# Patient Record
Sex: FEMALE | Race: WHITE | NOT HISPANIC OR LATINO | Employment: OTHER | URBAN - METROPOLITAN AREA
[De-identification: names, ages, dates, MRNs, and addresses within clinical notes are randomized per-mention and may not be internally consistent; named-entity substitution may affect disease eponyms.]

---

## 2019-09-02 ENCOUNTER — APPOINTMENT (OUTPATIENT)
Dept: CT IMAGING | Facility: HOSPITAL | Age: 74
End: 2019-09-02

## 2019-09-02 ENCOUNTER — HOSPITAL ENCOUNTER (INPATIENT)
Facility: HOSPITAL | Age: 74
LOS: 2 days | Discharge: HOME | End: 2019-09-04
Attending: EMERGENCY MEDICINE | Admitting: INTERNAL MEDICINE

## 2019-09-02 ENCOUNTER — APPOINTMENT (OUTPATIENT)
Dept: GENERAL RADIOLOGY | Facility: HOSPITAL | Age: 74
End: 2019-09-02

## 2019-09-02 ENCOUNTER — APPOINTMENT (OUTPATIENT)
Dept: ULTRASOUND IMAGING | Facility: HOSPITAL | Age: 74
End: 2019-09-02

## 2019-09-02 ENCOUNTER — APPOINTMENT (OUTPATIENT)
Dept: MRI IMAGING | Facility: HOSPITAL | Age: 74
End: 2019-09-02

## 2019-09-02 DIAGNOSIS — Z78.9 IMPAIRED MOBILITY AND ADLS: ICD-10-CM

## 2019-09-02 DIAGNOSIS — Z74.09 IMPAIRED FUNCTIONAL MOBILITY, BALANCE, GAIT, AND ENDURANCE: ICD-10-CM

## 2019-09-02 DIAGNOSIS — I16.0 HYPERTENSIVE URGENCY: ICD-10-CM

## 2019-09-02 DIAGNOSIS — R41.0 CONFUSION: Primary | ICD-10-CM

## 2019-09-02 DIAGNOSIS — R55 NEAR SYNCOPE: ICD-10-CM

## 2019-09-02 DIAGNOSIS — R53.1 GENERALIZED WEAKNESS: ICD-10-CM

## 2019-09-02 DIAGNOSIS — Z74.09 IMPAIRED MOBILITY AND ADLS: ICD-10-CM

## 2019-09-02 DIAGNOSIS — E86.0 DEHYDRATION: ICD-10-CM

## 2019-09-02 DIAGNOSIS — E87.1 HYPONATREMIA: ICD-10-CM

## 2019-09-02 PROBLEM — R10.9 ABDOMINAL PAIN: Status: ACTIVE | Noted: 2019-09-02

## 2019-09-02 LAB
ALBUMIN SERPL-MCNC: 4.6 G/DL (ref 3.5–5.2)
ALBUMIN/GLOB SERPL: 1.4 G/DL
ALP SERPL-CCNC: 47 U/L (ref 39–117)
ALT SERPL W P-5'-P-CCNC: 17 U/L (ref 1–33)
AMPHET+METHAMPHET UR QL: NEGATIVE
AMPHETAMINES UR QL: NEGATIVE
ANION GAP SERPL CALCULATED.3IONS-SCNC: 12 MMOL/L (ref 5–15)
ANION GAP SERPL CALCULATED.3IONS-SCNC: 13 MMOL/L (ref 5–15)
APAP SERPL-MCNC: <5 MCG/ML (ref 10–30)
AST SERPL-CCNC: 24 U/L (ref 1–32)
BACTERIA UR QL AUTO: ABNORMAL /HPF
BARBITURATES UR QL SCN: NEGATIVE
BASOPHILS # BLD AUTO: 0.03 10*3/MM3 (ref 0–0.2)
BASOPHILS NFR BLD AUTO: 0.4 % (ref 0–1.5)
BENZODIAZ UR QL SCN: NEGATIVE
BILIRUB SERPL-MCNC: 0.4 MG/DL (ref 0.2–1.2)
BILIRUB UR QL STRIP: NEGATIVE
BUN BLD-MCNC: 6 MG/DL (ref 8–23)
BUN BLD-MCNC: 7 MG/DL (ref 8–23)
BUN/CREAT SERPL: 8.2 (ref 7–25)
BUN/CREAT SERPL: 8.5 (ref 7–25)
BUPRENORPHINE SERPL-MCNC: NEGATIVE NG/ML
CALCIUM SPEC-SCNC: 9.2 MG/DL (ref 8.6–10.5)
CALCIUM SPEC-SCNC: 9.6 MG/DL (ref 8.6–10.5)
CANNABINOIDS SERPL QL: NEGATIVE
CHLORIDE SERPL-SCNC: 92 MMOL/L (ref 98–107)
CHLORIDE SERPL-SCNC: 93 MMOL/L (ref 98–107)
CLARITY UR: CLEAR
CO2 SERPL-SCNC: 24 MMOL/L (ref 22–29)
CO2 SERPL-SCNC: 25 MMOL/L (ref 22–29)
COCAINE UR QL: NEGATIVE
COLOR UR: YELLOW
CREAT BLD-MCNC: 0.73 MG/DL (ref 0.57–1)
CREAT BLD-MCNC: 0.82 MG/DL (ref 0.57–1)
DEPRECATED RDW RBC AUTO: 40.4 FL (ref 37–54)
EOSINOPHIL # BLD AUTO: 0.15 10*3/MM3 (ref 0–0.4)
EOSINOPHIL NFR BLD AUTO: 2.1 % (ref 0.3–6.2)
ERYTHROCYTE [DISTWIDTH] IN BLOOD BY AUTOMATED COUNT: 12.2 % (ref 12.3–15.4)
ETHANOL BLD-MCNC: <10 MG/DL (ref 0–10)
GFR SERPL CREATININE-BSD FRML MDRD: 68 ML/MIN/1.73
GFR SERPL CREATININE-BSD FRML MDRD: 78 ML/MIN/1.73
GLOBULIN UR ELPH-MCNC: 3.2 GM/DL
GLUCOSE BLD-MCNC: 123 MG/DL (ref 65–99)
GLUCOSE BLD-MCNC: 141 MG/DL (ref 65–99)
GLUCOSE UR STRIP-MCNC: NEGATIVE MG/DL
HCT VFR BLD AUTO: 40.7 % (ref 34–46.6)
HCT VFR BLD AUTO: 42.5 % (ref 34–46.6)
HGB BLD-MCNC: 13.4 G/DL (ref 12–15.9)
HGB BLD-MCNC: 14 G/DL (ref 12–15.9)
HGB UR QL STRIP.AUTO: ABNORMAL
HOLD SPECIMEN: NORMAL
HOLD SPECIMEN: NORMAL
HYALINE CASTS UR QL AUTO: ABNORMAL /LPF
IMM GRANULOCYTES # BLD AUTO: 0.01 10*3/MM3 (ref 0–0.05)
IMM GRANULOCYTES NFR BLD AUTO: 0.1 % (ref 0–0.5)
KETONES UR QL STRIP: ABNORMAL
LEUKOCYTE ESTERASE UR QL STRIP.AUTO: NEGATIVE
LYMPHOCYTES # BLD AUTO: 1.98 10*3/MM3 (ref 0.7–3.1)
LYMPHOCYTES NFR BLD AUTO: 27.6 % (ref 19.6–45.3)
MAGNESIUM SERPL-MCNC: 2.2 MG/DL (ref 1.6–2.4)
MCH RBC QN AUTO: 29.9 PG (ref 26.6–33)
MCHC RBC AUTO-ENTMCNC: 32.9 G/DL (ref 31.5–35.7)
MCV RBC AUTO: 90.8 FL (ref 79–97)
METHADONE UR QL SCN: NEGATIVE
MONOCYTES # BLD AUTO: 0.93 10*3/MM3 (ref 0.1–0.9)
MONOCYTES NFR BLD AUTO: 13 % (ref 5–12)
NEUTROPHILS # BLD AUTO: 4.08 10*3/MM3 (ref 1.7–7)
NEUTROPHILS NFR BLD AUTO: 56.8 % (ref 42.7–76)
NITRITE UR QL STRIP: NEGATIVE
NRBC BLD AUTO-RTO: 0 /100 WBC (ref 0–0.2)
OPIATES UR QL: NEGATIVE
OSMOLALITY SERPL: 268 MOSM/KG (ref 275–295)
OSMOLALITY UR: 257 MOSM/KG (ref 300–1100)
OXYCODONE UR QL SCN: NEGATIVE
PCP UR QL SCN: NEGATIVE
PH UR STRIP.AUTO: 8 [PH] (ref 5–8)
PLATELET # BLD AUTO: 243 10*3/MM3 (ref 140–450)
PMV BLD AUTO: 10 FL (ref 6–12)
POTASSIUM BLD-SCNC: 3.9 MMOL/L (ref 3.5–5.2)
POTASSIUM BLD-SCNC: 4 MMOL/L (ref 3.5–5.2)
PROPOXYPH UR QL: NEGATIVE
PROT SERPL-MCNC: 7.8 G/DL (ref 6–8.5)
PROT UR QL STRIP: NEGATIVE
RBC # BLD AUTO: 4.68 10*6/MM3 (ref 3.77–5.28)
RBC # UR: ABNORMAL /HPF
REF LAB TEST METHOD: ABNORMAL
SALICYLATES SERPL-MCNC: <0.3 MG/DL
SODIUM BLD-SCNC: 129 MMOL/L (ref 136–145)
SODIUM BLD-SCNC: 130 MMOL/L (ref 136–145)
SODIUM UR-SCNC: 89 MMOL/L
SP GR UR STRIP: 1.02 (ref 1–1.03)
SQUAMOUS #/AREA URNS HPF: ABNORMAL /HPF
T4 FREE SERPL-MCNC: 1.34 NG/DL (ref 0.93–1.7)
TRICYCLICS UR QL SCN: NEGATIVE
TROPONIN T SERPL-MCNC: <0.01 NG/ML (ref 0–0.03)
TSH SERPL DL<=0.05 MIU/L-ACNC: 3.28 UIU/ML (ref 0.27–4.2)
UROBILINOGEN UR QL STRIP: ABNORMAL
WBC NRBC COR # BLD: 7.18 10*3/MM3 (ref 3.4–10.8)
WBC UR QL AUTO: ABNORMAL /HPF
WHOLE BLOOD HOLD SPECIMEN: NORMAL
WHOLE BLOOD HOLD SPECIMEN: NORMAL

## 2019-09-02 PROCEDURE — 80307 DRUG TEST PRSMV CHEM ANLYZR: CPT | Performed by: EMERGENCY MEDICINE

## 2019-09-02 PROCEDURE — 84443 ASSAY THYROID STIM HORMONE: CPT | Performed by: EMERGENCY MEDICINE

## 2019-09-02 PROCEDURE — 99284 EMERGENCY DEPT VISIT MOD MDM: CPT

## 2019-09-02 PROCEDURE — 25010000002 HYDRALAZINE PER 20 MG: Performed by: INTERNAL MEDICINE

## 2019-09-02 PROCEDURE — 71045 X-RAY EXAM CHEST 1 VIEW: CPT

## 2019-09-02 PROCEDURE — 25010000002 ONDANSETRON PER 1 MG: Performed by: NURSE PRACTITIONER

## 2019-09-02 PROCEDURE — 25010000002 ENOXAPARIN PER 10 MG: Performed by: NURSE PRACTITIONER

## 2019-09-02 PROCEDURE — 74177 CT ABD & PELVIS W/CONTRAST: CPT

## 2019-09-02 PROCEDURE — 84300 ASSAY OF URINE SODIUM: CPT | Performed by: NURSE PRACTITIONER

## 2019-09-02 PROCEDURE — 83735 ASSAY OF MAGNESIUM: CPT | Performed by: EMERGENCY MEDICINE

## 2019-09-02 PROCEDURE — 80053 COMPREHEN METABOLIC PANEL: CPT | Performed by: EMERGENCY MEDICINE

## 2019-09-02 PROCEDURE — 93005 ELECTROCARDIOGRAM TRACING: CPT | Performed by: INTERNAL MEDICINE

## 2019-09-02 PROCEDURE — 84439 ASSAY OF FREE THYROXINE: CPT | Performed by: EMERGENCY MEDICINE

## 2019-09-02 PROCEDURE — 76830 TRANSVAGINAL US NON-OB: CPT

## 2019-09-02 PROCEDURE — 93010 ELECTROCARDIOGRAM REPORT: CPT | Performed by: INTERNAL MEDICINE

## 2019-09-02 PROCEDURE — 93005 ELECTROCARDIOGRAM TRACING: CPT | Performed by: EMERGENCY MEDICINE

## 2019-09-02 PROCEDURE — 25010000002 IOPAMIDOL 61 % SOLUTION: Performed by: EMERGENCY MEDICINE

## 2019-09-02 PROCEDURE — 85025 COMPLETE CBC W/AUTO DIFF WBC: CPT | Performed by: EMERGENCY MEDICINE

## 2019-09-02 PROCEDURE — 83930 ASSAY OF BLOOD OSMOLALITY: CPT | Performed by: NURSE PRACTITIONER

## 2019-09-02 PROCEDURE — 80306 DRUG TEST PRSMV INSTRMNT: CPT | Performed by: EMERGENCY MEDICINE

## 2019-09-02 PROCEDURE — 85018 HEMOGLOBIN: CPT | Performed by: INTERNAL MEDICINE

## 2019-09-02 PROCEDURE — 84484 ASSAY OF TROPONIN QUANT: CPT | Performed by: EMERGENCY MEDICINE

## 2019-09-02 PROCEDURE — 97162 PT EVAL MOD COMPLEX 30 MIN: CPT

## 2019-09-02 PROCEDURE — 80048 BASIC METABOLIC PNL TOTAL CA: CPT | Performed by: NURSE PRACTITIONER

## 2019-09-02 PROCEDURE — 25010000002 LORAZEPAM PER 2 MG: Performed by: INTERNAL MEDICINE

## 2019-09-02 PROCEDURE — 81001 URINALYSIS AUTO W/SCOPE: CPT | Performed by: EMERGENCY MEDICINE

## 2019-09-02 PROCEDURE — 83935 ASSAY OF URINE OSMOLALITY: CPT | Performed by: NURSE PRACTITIONER

## 2019-09-02 PROCEDURE — 70551 MRI BRAIN STEM W/O DYE: CPT

## 2019-09-02 PROCEDURE — 85014 HEMATOCRIT: CPT | Performed by: INTERNAL MEDICINE

## 2019-09-02 PROCEDURE — 99222 1ST HOSP IP/OBS MODERATE 55: CPT | Performed by: INTERNAL MEDICINE

## 2019-09-02 PROCEDURE — 97165 OT EVAL LOW COMPLEX 30 MIN: CPT

## 2019-09-02 RX ORDER — ARIPIPRAZOLE 10 MG/1
15 TABLET ORAL DAILY
COMMUNITY
End: 2019-09-04 | Stop reason: HOSPADM

## 2019-09-02 RX ORDER — ACETAMINOPHEN 160 MG/5ML
650 SOLUTION ORAL EVERY 4 HOURS PRN
Status: DISCONTINUED | OUTPATIENT
Start: 2019-09-02 | End: 2019-09-04 | Stop reason: HOSPADM

## 2019-09-02 RX ORDER — SODIUM CHLORIDE 0.9 % (FLUSH) 0.9 %
10 SYRINGE (ML) INJECTION AS NEEDED
Status: DISCONTINUED | OUTPATIENT
Start: 2019-09-02 | End: 2019-09-04 | Stop reason: HOSPADM

## 2019-09-02 RX ORDER — ARIPIPRAZOLE 10 MG/1
10 TABLET ORAL DAILY
Status: DISCONTINUED | OUTPATIENT
Start: 2019-09-02 | End: 2019-09-02

## 2019-09-02 RX ORDER — DOCUSATE SODIUM 100 MG/1
100 CAPSULE, LIQUID FILLED ORAL 2 TIMES DAILY
Status: DISCONTINUED | OUTPATIENT
Start: 2019-09-02 | End: 2019-09-04 | Stop reason: HOSPADM

## 2019-09-02 RX ORDER — PRAVASTATIN SODIUM 20 MG
20 TABLET ORAL NIGHTLY
Status: DISCONTINUED | OUTPATIENT
Start: 2019-09-02 | End: 2019-09-04 | Stop reason: HOSPADM

## 2019-09-02 RX ORDER — HYDRALAZINE HYDROCHLORIDE 20 MG/ML
20 INJECTION INTRAMUSCULAR; INTRAVENOUS EVERY 6 HOURS PRN
Status: DISCONTINUED | OUTPATIENT
Start: 2019-09-02 | End: 2019-09-04 | Stop reason: HOSPADM

## 2019-09-02 RX ORDER — ONDANSETRON 2 MG/ML
4 INJECTION INTRAMUSCULAR; INTRAVENOUS EVERY 6 HOURS PRN
Status: DISCONTINUED | OUTPATIENT
Start: 2019-09-02 | End: 2019-09-04 | Stop reason: HOSPADM

## 2019-09-02 RX ORDER — SODIUM CHLORIDE 9 MG/ML
75 INJECTION, SOLUTION INTRAVENOUS ONCE
Status: COMPLETED | OUTPATIENT
Start: 2019-09-02 | End: 2019-09-02

## 2019-09-02 RX ORDER — ONDANSETRON 4 MG/1
4 TABLET, FILM COATED ORAL EVERY 6 HOURS PRN
Status: DISCONTINUED | OUTPATIENT
Start: 2019-09-02 | End: 2019-09-04 | Stop reason: HOSPADM

## 2019-09-02 RX ORDER — SODIUM CHLORIDE 0.9 % (FLUSH) 0.9 %
10 SYRINGE (ML) INJECTION EVERY 12 HOURS SCHEDULED
Status: DISCONTINUED | OUTPATIENT
Start: 2019-09-02 | End: 2019-09-04 | Stop reason: HOSPADM

## 2019-09-02 RX ORDER — ACETAMINOPHEN 650 MG/1
650 SUPPOSITORY RECTAL EVERY 4 HOURS PRN
Status: DISCONTINUED | OUTPATIENT
Start: 2019-09-02 | End: 2019-09-04 | Stop reason: HOSPADM

## 2019-09-02 RX ORDER — PRAVASTATIN SODIUM 20 MG
20 TABLET ORAL NIGHTLY
COMMUNITY

## 2019-09-02 RX ORDER — SODIUM CHLORIDE 9 MG/ML
100 INJECTION, SOLUTION INTRAVENOUS CONTINUOUS
Status: DISCONTINUED | OUTPATIENT
Start: 2019-09-02 | End: 2019-09-03

## 2019-09-02 RX ORDER — ARIPIPRAZOLE 10 MG/1
15 TABLET ORAL DAILY
Status: DISCONTINUED | OUTPATIENT
Start: 2019-09-02 | End: 2019-09-02 | Stop reason: ALTCHOICE

## 2019-09-02 RX ORDER — CHLORPROMAZINE HYDROCHLORIDE 50 MG/1
50 TABLET, FILM COATED ORAL DAILY
Status: DISCONTINUED | OUTPATIENT
Start: 2019-09-02 | End: 2019-09-03

## 2019-09-02 RX ORDER — ACETAMINOPHEN 325 MG/1
650 TABLET ORAL EVERY 4 HOURS PRN
Status: DISCONTINUED | OUTPATIENT
Start: 2019-09-02 | End: 2019-09-04 | Stop reason: HOSPADM

## 2019-09-02 RX ORDER — LORAZEPAM 2 MG/ML
0.5 INJECTION INTRAMUSCULAR ONCE
Status: COMPLETED | OUTPATIENT
Start: 2019-09-02 | End: 2019-09-02

## 2019-09-02 RX ADMIN — SODIUM CHLORIDE, PRESERVATIVE FREE 10 ML: 5 INJECTION INTRAVENOUS at 21:29

## 2019-09-02 RX ADMIN — DOCUSATE SODIUM 100 MG: 100 CAPSULE, LIQUID FILLED ORAL at 21:28

## 2019-09-02 RX ADMIN — ENOXAPARIN SODIUM 40 MG: 40 INJECTION SUBCUTANEOUS at 08:43

## 2019-09-02 RX ADMIN — SODIUM CHLORIDE 1000 ML: 9 INJECTION, SOLUTION INTRAVENOUS at 19:47

## 2019-09-02 RX ADMIN — CHLORPROMAZINE HYDROCHLORIDE 50 MG: 50 TABLET, SUGAR COATED ORAL at 11:43

## 2019-09-02 RX ADMIN — IOPAMIDOL 100 ML: 612 INJECTION, SOLUTION INTRAVENOUS at 02:31

## 2019-09-02 RX ADMIN — SODIUM CHLORIDE 1000 ML: 9 INJECTION, SOLUTION INTRAVENOUS at 01:00

## 2019-09-02 RX ADMIN — LORAZEPAM 0.5 MG: 2 INJECTION INTRAMUSCULAR; INTRAVENOUS at 13:11

## 2019-09-02 RX ADMIN — SODIUM CHLORIDE 75 ML/HR: 9 INJECTION, SOLUTION INTRAVENOUS at 04:52

## 2019-09-02 RX ADMIN — SODIUM CHLORIDE 50 ML/HR: 9 INJECTION, SOLUTION INTRAVENOUS at 13:11

## 2019-09-02 RX ADMIN — POLYETHYLENE GLYCOL 3350 17 G: 17 POWDER, FOR SOLUTION ORAL at 08:42

## 2019-09-02 RX ADMIN — ONDANSETRON 4 MG: 2 INJECTION INTRAMUSCULAR; INTRAVENOUS at 04:52

## 2019-09-02 RX ADMIN — HYDRALAZINE HYDROCHLORIDE 20 MG: 20 INJECTION INTRAMUSCULAR; INTRAVENOUS at 11:44

## 2019-09-02 RX ADMIN — SODIUM CHLORIDE 1000 ML: 9 INJECTION, SOLUTION INTRAVENOUS at 15:45

## 2019-09-02 RX ADMIN — DOCUSATE SODIUM 100 MG: 100 CAPSULE, LIQUID FILLED ORAL at 08:43

## 2019-09-02 RX ADMIN — PRAVASTATIN SODIUM 20 MG: 20 TABLET ORAL at 21:28

## 2019-09-02 NOTE — ED PROVIDER NOTES
"Subjective   74-year-old female presents for evaluation of generalized weakness, confusion, near syncope, and anorexia.  Of note, the patient states that she was on Thorazine since the age of 19 and took it for approximately 50 years before it was stopped 3 months ago when she was switched to Abilify.  She states that since that time she has had a gradual decline over the past few months.  Her daughter lives here in [place], and the patient lives in [facility].  Today, her daughter noted that she seems to be significantly confused when compared to baseline.  The patient feels as if she is going to pass out when she stands up.  Per her daughter, she has been \"talking out of her head.\"  She has also had a very poor appetite as of late and has had an unintentional weight loss.  Given her myriad of symptoms, she was brought here to be evaluated today.  The patient denies any headache.  No vision changes.  She is unsure as what may be triggering her symptoms.  In addition, she also states that her urine has a foul smell to it and notes a \"knot\" in her vaginal region that she is concerned about.        History provided by:  Patient and relative  Weakness - Generalized   Severity:  Moderate  Onset quality:  Gradual  Duration: 3 months.  Progression:  Worsening  Context: change in medication    Associated symptoms: abdominal pain, anorexia, foul-smelling urine and near-syncope    Associated symptoms: no chest pain, no cough, no fever and no shortness of breath        Review of Systems   Constitutional: Negative for fever.   Respiratory: Negative for cough and shortness of breath.    Cardiovascular: Positive for near-syncope. Negative for chest pain.   Gastrointestinal: Positive for abdominal pain and anorexia.   Genitourinary:        +malodorous urine   Neurological: Positive for weakness and light-headedness.   Psychiatric/Behavioral: Positive for confusion.   All other systems reviewed and are negative.      Past " Medical History:   Diagnosis Date   • Anxiety    • Depression    • HLD (hyperlipidemia)    • Hypertension    • Urinary tract infection        No Known Allergies    Past Surgical History:   Procedure Laterality Date   • HYSTERECTOMY         Family History   Problem Relation Age of Onset   • Cancer Mother    • Heart disease Father        Social History     Socioeconomic History   • Marital status:      Spouse name: Not on file   • Number of children: Not on file   • Years of education: Not on file   • Highest education level: Not on file   Tobacco Use   • Smoking status: Never Smoker   • Smokeless tobacco: Never Used   Substance and Sexual Activity   • Alcohol use: No     Frequency: Never   • Drug use: No   • Sexual activity: No         Objective   Physical Exam   Constitutional: She is oriented to person, place, and time. She appears well-developed and well-nourished. No distress.   Nontoxic-appearing elderly female in no acute distress   HENT:   Head: Normocephalic and atraumatic.   Mouth/Throat: Oropharynx is clear and moist.   Eyes: EOM are normal. Pupils are equal, round, and reactive to light.   Neck: Normal range of motion. Neck supple. No JVD present.   No meningeal signs, no nuchal rigidity   Cardiovascular: Normal rate, regular rhythm, normal heart sounds and intact distal pulses. Exam reveals no gallop and no friction rub.   No murmur heard.  Pulmonary/Chest: Effort normal and breath sounds normal. No respiratory distress. She has no wheezes. She has no rales.   Abdominal: Soft. Bowel sounds are normal. She exhibits no distension and no mass. There is no tenderness. There is no rebound and no guarding.   Genitourinary:   Genitourinary Comments: Patient deferred pelvic exam   Musculoskeletal: Normal range of motion. She exhibits no edema.   Neurological: She is alert and oriented to person, place, and time. No cranial nerve deficit or sensory deficit. Coordination normal.   Neurovascularly intact  distally in all fours no ataxia, no dysmetria, clear and fluent speech, awake, alert, and oriented x3, 5 out of 5 strength in all fours   Skin: Skin is warm and dry. No rash noted. She is not diaphoretic. No erythema.   Psychiatric: She has a normal mood and affect. Judgment and thought content normal.   Nursing note and vitals reviewed.      Procedures         ED Course  ED Course as of Sep 02 0414   Mon Sep 02, 2019   0302 Paged on-call hospitalist. -DMD  [MU]   4102 Spoke with Dr. Watters, on-call hospitalist, who will admit. -DMD  [MU]   [id number] 74-year-old female presents for evaluation of generalized weakness, poor appetite, unintentional weight loss, and confusion over the past 3 months, acutely worse over the past 24 hours.  On arrival to the ED, patient nontoxic-appearing.  No focal neurological deficits noted on exam.  Labs remarkable for mild hyponatremia as well as mild ketonuria.  MRI brain negative for acute emergent process.  Chest x-ray negative.  CT of abdomen/pelvis negative for acute emergent or surgical intra-abdominal process.  Patient declined pelvic exam.  The patient lives alone in [facility].  Given her social situation and current symptoms, I do not feel that she is safe to be discharged home at this point.  I discussed her case with Dr. Watters, and she will be admitted under her care for further evaluation and treatment.  The patient is hemodynamically stable at this time and family is aware/agreeable with the plan.  [DD]      ED Course User Index  [DD] Armando Roy MD  [MU] Yunier Rosas               Recent Results (from the past 24 hour(s))   Comprehensive Metabolic Panel    Collection Time: 09/02/19 12:57 AM   Result Value Ref Range    Glucose 141 (H) 65 - 99 mg/dL    BUN 7 (L) 8 - 23 mg/dL    Creatinine 0.82 0.57 - 1.00 mg/dL    Sodium 129 (L) 136 - 145 mmol/L    Potassium 3.9 3.5 - 5.2 mmol/L    Chloride 92 (L) 98 - 107 mmol/L    CO2 25.0 22.0 - 29.0 mmol/L    Calcium 9.6  8.6 - 10.5 mg/dL    Total Protein 7.8 6.0 - 8.5 g/dL    Albumin 4.60 3.50 - 5.20 g/dL    ALT (SGPT) 17 1 - 33 U/L    AST (SGOT) 24 1 - 32 U/L    Alkaline Phosphatase 47 39 - 117 U/L    Total Bilirubin 0.4 0.2 - 1.2 mg/dL    eGFR Non African Amer 68 >60 mL/min/1.73    Globulin 3.2 gm/dL    A/G Ratio 1.4 g/dL    BUN/Creatinine Ratio 8.5 7.0 - 25.0    Anion Gap 12.0 5.0 - 15.0 mmol/L   Troponin    Collection Time: 09/02/19 12:57 AM   Result Value Ref Range    Troponin T <0.010 0.000 - 0.030 ng/mL   Magnesium    Collection Time: 09/02/19 12:57 AM   Result Value Ref Range    Magnesium 2.2 1.6 - 2.4 mg/dL   Light Blue Top    Collection Time: 09/02/19 12:57 AM   Result Value Ref Range    Extra Tube hold for add-on    Green Top (Gel)    Collection Time: 09/02/19 12:57 AM   Result Value Ref Range    Extra Tube Hold for add-ons.    Lavender Top    Collection Time: 09/02/19 12:57 AM   Result Value Ref Range    Extra Tube hold for add-on    Gold Top - SST    Collection Time: 09/02/19 12:57 AM   Result Value Ref Range    Extra Tube Hold for add-ons.    CBC Auto Differential    Collection Time: 09/02/19 12:57 AM   Result Value Ref Range    WBC 7.18 3.40 - 10.80 10*3/mm3    RBC 4.68 3.77 - 5.28 10*6/mm3    Hemoglobin 14.0 12.0 - 15.9 g/dL    Hematocrit 42.5 34.0 - 46.6 %    MCV 90.8 79.0 - 97.0 fL    MCH 29.9 26.6 - 33.0 pg    MCHC 32.9 31.5 - 35.7 g/dL    RDW 12.2 (L) 12.3 - 15.4 %    RDW-SD 40.4 37.0 - 54.0 fl    MPV 10.0 6.0 - 12.0 fL    Platelets 243 140 - 450 10*3/mm3    Neutrophil % 56.8 42.7 - 76.0 %    Lymphocyte % 27.6 19.6 - 45.3 %    Monocyte % 13.0 (H) 5.0 - 12.0 %    Eosinophil % 2.1 0.3 - 6.2 %    Basophil % 0.4 0.0 - 1.5 %    Immature Grans % 0.1 0.0 - 0.5 %    Neutrophils, Absolute 4.08 1.70 - 7.00 10*3/mm3    Lymphocytes, Absolute 1.98 0.70 - 3.10 10*3/mm3    Monocytes, Absolute 0.93 (H) 0.10 - 0.90 10*3/mm3    Eosinophils, Absolute 0.15 0.00 - 0.40 10*3/mm3    Basophils, Absolute 0.03 0.00 - 0.20 10*3/mm3     Immature Grans, Absolute 0.01 0.00 - 0.05 10*3/mm3    nRBC 0.0 0.0 - 0.2 /100 WBC   TSH    Collection Time: 09/02/19 12:57 AM   Result Value Ref Range    TSH 3.280 0.270 - 4.200 uIU/mL   T4, Free    Collection Time: 09/02/19 12:57 AM   Result Value Ref Range    Free T4 1.34 0.93 - 1.70 ng/dL   Salicylate Level    Collection Time: 09/02/19 12:57 AM   Result Value Ref Range    Salicylate <0.3 <=30.0 mg/dL   Ethanol    Collection Time: 09/02/19 12:57 AM   Result Value Ref Range    Ethanol <10 0 - 10 mg/dL   Acetaminophen Level    Collection Time: 09/02/19 12:57 AM   Result Value Ref Range    Acetaminophen <5.0 (L) 10.0 - 30.0 mcg/mL   Urinalysis With Microscopic If Indicated (No Culture) - Urine, Clean Catch    Collection Time: 09/02/19  2:53 AM   Result Value Ref Range    Color, UA Yellow Yellow, Straw    Appearance, UA Clear Clear    pH, UA 8.0 5.0 - 8.0    Specific Gravity, UA 1.016 1.001 - 1.030    Glucose, UA Negative Negative    Ketones, UA 15 mg/dL (1+) (A) Negative    Bilirubin, UA Negative Negative    Blood, UA Moderate (2+) (A) Negative    Protein, UA Negative Negative    Leuk Esterase, UA Negative Negative    Nitrite, UA Negative Negative    Urobilinogen, UA 0.2 E.U./dL 0.2 - 1.0 E.U./dL   Urine Drug Screen - Urine, Clean Catch    Collection Time: 09/02/19  2:53 AM   Result Value Ref Range    THC, Screen, Urine Negative Negative    Phencyclidine (PCP), Urine Negative Negative    Cocaine Screen, Urine Negative Negative    Methamphetamine, Ur Negative Negative    Opiate Screen Negative Negative    Amphetamine Screen, Urine Negative Negative    Benzodiazepine Screen, Urine Negative Negative    Tricyclic Antidepressants Screen Negative Negative    Methadone Screen, Urine Negative Negative    Barbiturates Screen, Urine Negative Negative    Oxycodone Screen, Urine Negative Negative    Propoxyphene Screen Negative Negative    Buprenorphine, Screen, Urine Negative Negative   Urinalysis, Microscopic Only - Urine,  Clean Catch    Collection Time: 09/02/19  2:53 AM   Result Value Ref Range    RBC, UA 7-12 (A) None Seen, 0-2 /HPF    WBC, UA None Seen None Seen, 0-2 /HPF    Bacteria, UA None Seen None Seen, Trace /HPF    Squamous Epithelial Cells, UA None Seen None Seen, 0-2 /HPF    Hyaline Casts, UA 0-6 0 - 6 /LPF    Methodology Automated Microscopy      Note: In addition to lab results from this visit, the labs listed above may include labs taken at another facility or during a different encounter within the last 24 hours. Please correlate lab times with ED admission and discharge times for further clarification of the services performed during this visit.    CT Abdomen Pelvis With Contrast   Final Result      1. The neck of the bladder is widened and there is pelvic floor relaxation. Anterior to the neck of the bladder posterior to the symphysis pubis associated with the anterior inferior bladder wall is a soft tissue fullness which may correspond to the mass   described by the patient on physical exam. It measures up to about 3.2 cm in dimension. Please correlate with physical exam findings. This could be a mass arising from the bladder or possibly the labia and its currently nonspecific.   2. Atherosclerotic vascular calcifications.   3. Moderate-sized hilar hernia.            Signer Name: Trini Li MD    Signed: 9/2/2019 3:20 AM    Workstation Name: [facility]     Radiology [facility]      MRI Brain Without Contrast   Final Result   No acute intracranial abnormality. No recent infarct.   2. Atrophy and mild probable sequelae of small vessel disease.   3. There is what is likely a incidental arachnoid cyst overlying the medial anterior left frontal lobe.      Signer Name: Trini Li MD    Signed: 9/2/2019 2:19 AM    Workstation Name: [facility]     Radiology [facility]      XR Chest 1 View   Final Result   Generalized prominence of the interstitial markings could be acute or chronic in the  "absence of prior films.      Signer Name: Trini Li MD    Signed: 9/2/2019 1:22 AM    Workstation Name: [email or web address]     [facility]        Vitals:    09/02/19 0037 09/02/19 0245   BP: (!) 221/107 156/94   BP Location: Right arm Right arm   Patient Position: Sitting Sitting   Pulse: 94 91   Resp: 16 16   Temp: 97.8 °F (36.6 °C)    TempSrc: Oral    SpO2: 97% 98%   Weight: 73 kg (161 lb)    Height: 167.6 cm (66\")      Medications   sodium chloride 0.9 % flush 10 mL (not administered)   sodium chloride 0.9 % flush 10 mL (not administered)   sodium chloride 0.9 % bolus 1,000 mL (0 mL Intravenous Stopped 9/2/19 0130)   iopamidol (ISOVUE-300) 61 % injection 100 mL (100 mL Intravenous Given 9/2/19 0231)     ECG/EMG Results (last 24 hours)     Procedure Component Value Units Date/Time    ECG 12 Lead [[id number]] Collected:  09/02/19 0053     Updated:  09/02/19 0052        ECG 12 Lead                   MDM    Final diagnoses:   Confusion   Near syncope   Hyponatremia   Dehydration       Documentation assistance provided by hayden Rosas.  Information recorded by the scribe was done at my direction and has been verified and validated by me.     Yunier Rosas  09/02/19 0102       Alison, Yunier  09/02/19 0311       Alison, Yunier  09/02/19 0311       Alison, Yunier  09/02/19 0312       Alison, Yunier  09/02/19 0342       Armando Roy MD  09/02/19 0414    "

## 2019-09-02 NOTE — H&P
"    [facility] Medicine Services  HISTORY AND PHYSICAL    Patient Name: Betsy Youssef  : 1945  MRN: [id number]  Primary Care Physician: Duane Crow MD  Date of admission: 2019      Subjective   Subjective     Chief Complaint:  Altered mental status    HPI:  Betsy Youssef is a 74 y.o. female who presents to the ED with complaints of generalized weakness and abdominal pain.  Patient reports that for the last 3 months she has had diffuse abdominal discomfort with some constipation, fatigue, loss of appetite, dizziness, foul odor to urine, generalized weakness, and \"not feeling like doing anything\".  Three months ago she was taken off Thorazine and put on Abilify. Since the change of this medication she has not felt well.  Last Thursday she was seen in the ED at [facility] with abdominal pain and hypertension, was discharged home with follow up with PCP.  Today her symptoms returned but worse which prompted her to come to the ED.  She denies fever, shortness of air, chest pain, nausea, vomiting, diarrhea, dysuria, headaches, confusion, anxiety, depression, syncope, or any other complaints at this time.  Patient was given one liter of saline in the ED, and is being admitted to the Hospitalist for further evaluation and management.        Review of Systems   Constitutional: Positive for appetite change and fatigue. Negative for activity change, chills, diaphoresis and fever.   HENT: Negative.    Eyes: Negative.    Respiratory: Negative.    Cardiovascular: Negative.    Gastrointestinal: Positive for abdominal pain (diffuse) and constipation. Negative for abdominal distention, blood in stool, diarrhea, nausea and vomiting.   Endocrine: Negative.    Genitourinary: Negative for decreased urine volume, difficulty urinating, dysuria, flank pain, frequency, hematuria and urgency.        Foul odor to urine for 2 months   Musculoskeletal: Negative.    Skin: Negative.  "   Allergic/Immunologic: Positive for environmental allergies.   Neurological: Positive for dizziness, tremors and weakness (generalized). Negative for syncope, light-headedness, numbness and headaches.   Hematological: Negative.    Psychiatric/Behavioral: Negative for agitation, behavioral problems, confusion, decreased concentration, hallucinations, self-injury and suicidal ideas. The patient is not nervous/anxious and is not hyperactive.         All other systems reviewed and are negative.     Personal History     Past Medical History:   Diagnosis Date   • Anxiety    • Depression    • HLD (hyperlipidemia)    • Hypertension    • Urinary tract infection        Past Surgical History:   Procedure Laterality Date   • HYSTERECTOMY         Family History: family history includes Cancer in her mother; Heart disease in her father. Otherwise pertinent FHx was reviewed and unremarkable.     Social History:  reports that she has never smoked. She has never used smokeless tobacco. She reports that she does not drink alcohol or use drugs.  Social History     Social History Narrative   • Not on file       Medications:    Available home medication information reviewed.    (Not in a hospital admission)    No Known Allergies    Objective   Objective     Vital Signs:   Temp:  [97.8 °F (36.6 °C)] 97.8 °F (36.6 °C)  Heart Rate:  [91-94] 91  Resp:  [16] 16  BP: (156-221)/() 156/94        Physical Exam   Constitutional: She is oriented to person, place, and time. She appears well-developed. No distress.   HENT:   Head: Normocephalic.   Eyes: Pupils are equal, round, and reactive to light.   Neck: Normal range of motion. Neck supple.   Cardiovascular: Normal rate, regular rhythm, normal heart sounds and intact distal pulses. Exam reveals no gallop and no friction rub.   No murmur heard.  Pulmonary/Chest: Effort normal and breath sounds normal. No stridor. No respiratory distress. She has no wheezes. She has no rales.   Abdominal:  Soft. Bowel sounds are normal. She exhibits no distension and no mass. There is no tenderness. There is no rebound and no guarding.   Musculoskeletal: Normal range of motion. She exhibits no edema, tenderness or deformity.   Neurological: She is alert and oriented to person, place, and time. No cranial nerve deficit.   Skin: Skin is warm and dry. No rash noted. She is not diaphoretic. No erythema. No pallor.   Psychiatric: She has a normal mood and affect. Her behavior is normal. Thought content normal.        Results Reviewed:  I have personally reviewed current lab and radiology data.    Results from last 7 days   Lab Units 09/02/19  0057   WBC 10*3/mm3 7.18   HEMOGLOBIN g/dL 14.0   HEMATOCRIT % 42.5   PLATELETS 10*3/mm3 243     Results from last 7 days   Lab Units 09/02/19  0057   SODIUM mmol/L 129*   POTASSIUM mmol/L 3.9   CHLORIDE mmol/L 92*   CO2 mmol/L 25.0   BUN mg/dL 7*   CREATININE mg/dL 0.82   GLUCOSE mg/dL 141*   CALCIUM mg/dL 9.6   ALT (SGPT) U/L 17   AST (SGOT) U/L 24   TROPONIN T ng/mL <0.010     Estimated Creatinine Clearance: 61.6 mL/min (by C-G formula based on SCr of 0.82 mg/dL).  Brief Urine Lab Results  (Last result in the past 365 days)      Color   Clarity   Blood   Leuk Est   Nitrite   Protein   CREAT   Urine HCG        09/02/19 0253 Yellow Clear Moderate (2+) Negative Negative Negative             Imaging Results (last 24 hours)     Procedure Component Value Units Date/Time    CT Abdomen Pelvis With Contrast [[id number]] Collected:  09/02/19 0320     Updated:  09/02/19 0322    Narrative:       INDICATION:   Abdominal pain and weight loss foul-smelling urine confusion 10 pound weight loss in 3 months. Vaginal knot. Poor appetite.    TECHNIQUE:   CT of the abdomen and pelvis during the intravenous ministration of nonionic contrast media. contrast. Contrast dose recorded inpatient chart. Coronal and sagittal reconstructions were obtained.  Radiation dose reduction techniques included  automated  exposure control or exposure modulation based on body size. Radiation audit for number of CT and nuclear cardiology exams performed in the last year: 0.      COMPARISON:   None available.    FINDINGS:  Lung bases: There is some breathing motion. There is some dependent atelectasis. Calcified granuloma at the medial right lung base.    There is a moderate size hiatal hernia.    Abdomen: Liver, gallbladder, spleen, adrenal glands, pancreas are normal. There are multiple bilateral renal cysts and too small to characterize probable renal cysts. There is no hydronephrosis. There are atherosclerotic vascular calcifications involving  the abdominal aorta and vessel origins.    There is no evidence for bowel obstruction. Appendix is radiographically unremarkable.    Pelvis: There is widening of the bladder neck and likely pelvic floor relaxation. There is soft tissue fullness posterior to the symphysis pubis and at the anterior aspect of the bladder which may correspond to the mass on physical examination per  patient. It measures about 2.5 x 2.9 x 3.2 cm in dimension. Correlate with physical exam findings. Pathology arising from the anterior inferior bladder wall or possibly the labia in the differential.    No free fluid in the abdomen or pelvis. Small fat-containing periumbilical hernia.      Impression:         1. The neck of the bladder is widened and there is pelvic floor relaxation. Anterior to the neck of the bladder posterior to the symphysis pubis associated with the anterior inferior bladder wall is a soft tissue fullness which may correspond to the mass  described by the patient on physical exam. It measures up to about 3.2 cm in dimension. Please correlate with physical exam findings. This could be a mass arising from the bladder or possibly the labia and its currently nonspecific.  2. Atherosclerotic vascular calcifications.  3. Moderate-sized hilar hernia.        Signer Name: Trini Li MD    Signed: 9/2/2019 3:20 AM   Workstation Name: [facility]    Radiology [facility]    MRI Brain Without Contrast [[id number]] Collected:  09/02/19 0219     Updated:  09/02/19 0221    Narrative:       INDICATION:    Syncope confusion altered mental status increased weakness since starting Abilify.    TECHNIQUE:   MRI of the brain without contrast.    COMPARISON:    None available.    FINDINGS:  There is no abnormally restricted diffusion. There is no Chiari I malformation. Degenerative changes in the visualized upper cervical spine. There is likely an arachnoid cyst at the anteromedial aspect overlying the left frontal lobe. Its measures about  1.3 x 4.7 x 2.1 cm dimension. There is only mild associated mass effect upon the underlying frontal lobe. There is generalized atrophy otherwise. The major arterial intracranial flow voids are maintained. The mastoid air cells are clear. Only mild  mucosal thickening in the visualized paranasal sinuses. Prominent perivascular spaces and/or small chronic locations in the bilateral basal ganglia. Mild periventricular white matter signal abnormality is nonspecific but likely due to small vessel  disease.      Impression:       No acute intracranial abnormality. No recent infarct.  2. Atrophy and mild probable sequelae of small vessel disease.  3. There is what is likely a incidental arachnoid cyst overlying the medial anterior left frontal lobe.    Signer Name: Trini Li MD   Signed: 9/2/2019 2:19 AM   Workstation Name: [facility]    Radiology [facility]    XR Chest 1 View [[id number]] Collected:  09/02/19 0122     Updated:  09/02/19 0124    Narrative:       CR Chest 1 Vw    INDICATION:   Syncope and weakness altered mental status     COMPARISON:    None available.    FINDINGS:  Single portable AP view(s) of the chest.  Cardiac silhouette size is normal. There is generalized prominence of the interstitial markings. I suspect underlying chronic lung  disease. I do not have comparison films. Component of acute interstitial edema or  atypical infection not excluded. No pleural effusion or pneumothorax. No focal alveolar infiltrate.      Impression:       Generalized prominence of the interstitial markings could be acute or chronic in the absence of prior films.    Signer Name: Trini Li MD   Signed: 9/2/2019 1:22 AM   Workstation Name: [email or web address]    [facility]             Assessment/Plan   Assessment / Plan     Active Hospital Problems    Diagnosis POA   • **Hypertensive urgency [I16.0] Yes   • Hyponatremia [E87.1] Yes   • Generalized weakness [R53.1] Yes   • Abdominal pain [R10.9] Yes       ASSESSMENT and PLAN:    1.  Hypertensive urgency   -hydralazine prn    2.  Generalized weakness   -fall precautions   -pt/ot consult in the am   -consult case management in the am    3.  Possible pelvic mass   -transvaginal ultrasound     4.  Hyponatremia   -urine sodium and urine osmolality   -serum sodium   -NS @ 75 ml/hr x 1 liter   -bmp in the am    5.  Anxiety and depression   -continue abilify    -consider switching to a different antipsychotic medication    -need referral to psych      DVT prophylaxis:  lovenox    CODE STATUS:    Code Status and Medical Interventions:   Ordered at: 09/02/19 0422     Level Of Support Discussed With:    Patient     Code Status:    CPR     Medical Interventions (Level of Support Prior to Arrest):    Full       Admission Status:  I believe this patient meets OBSERVATION status, however if further evaluation or treatment plans warrant, status may change.  Based upon current information, I predict patient's care encounter to be less than or equal to 2 midnights.      Electronically signed by SPEEDY Palomino, 09/02/19, 3:11 AM.      Brief Attending Admission Attestation     I have seen and examined the patient, performing an independent face-to-face diagnostic evaluation with plan of care reviewed and  developed with the advanced practice clinician (APC).      Brief Summary Statement:   Betsy Youssef is a 74 y.o. female with history of possible psychiatric condition presents with constant mild nausea and abdominal discomfort for the past three months. She denies specific areas of abdominal pain, but does describe poor appetite and constipation. Weight loss of 10 lbs over the past three months. Denies dysuria, but foul smelling urine reported.     Symptoms began after patient's long term prescription for thorazine discontinued and abilify substituted instead. The patient was placed on thorazine approximately [facility] years ago [facility]. Ms Youssef denies a diagnosis of schizophrenia, never suffered from hallucinations or heard voices, but instead says the thorazine helped with her upset stomach and anxiety. The patient now reports that thorazine is no longer manufactured, and her PCP switched her abilify when thorazine could not be obtained.        Remainder of detailed HPI is as noted above and has been reviewed and/or edited by me for completeness.      Attending Physical Exam:  Constitutional - non toxic, in bed  HEENT-NCAT, mucous membranes moist  CV-RRR, S1 S2 normal, no m/r/g  Resp-CTAB, no wheezes, rhonchi or rales  Abd-soft, non-tender, non-distended, normo active bowel sounds  Ext-No lower extremity cyanosis, clubbing or edema bilaterally  Neuro-alert and oriented, speech clear, moves all extremities   Psych-normal affect   Skin- No rash on exposed UE or LE bilaterally      Brief Assessment/Plan :    Nausea and abdominal discomfort  - appears related to switch from Thorazine to Abilify three months ago  - suggest discussing alternatives with Pharmacy and/or Neurology in am.  Compazine is in the same drug class (Phenothiazine; first generation antipsychotic), however I am not familiar with long term dosing of this medication. Alternatively,  could consider weaning off of abilify altogether.  - Patient's PCP suggested follow up with Psychiatry to find and alternative to abilify, and I recommended the same. [facility] may be a possible resource.    Pelvic mass?  - noted on CT imaging this evening. Patient currently denies abdominal pain. Will check TV ultrasound. Also consider image review with our radiologists in the morning.     Constipation  - suspect secondary to abilify  - will start stool softeners    Hematuria  - moderate blood on UA; will need repeat UA (likely as outpatient) and if remains positive, Urology referral    Hyponatremia  - mild, asymptomatic, may be related to poor po intake  - gentle IV fluids tonight, repeat bmp in am, serum and urine osmoles pending      See above for further detailed assessment and plan developed with APC which I have reviewed and/or edited for completeness.      Electronically signed by Crow Quesada MD, 09/02/19, 4:29 AM.

## 2019-09-02 NOTE — PROGRESS NOTES
Discharge Planning Assessment  [facility]     Patient Name: Betsy Youssef  MRN: [id number]  Today's Date: 9/2/2019    Admit Date: 9/2/2019    Discharge Needs Assessment     Row Name 09/02/19 1122       Living Environment    Lives With  alone    Current Living Arrangements  home/apartment/condo Lives in [facility]    Primary Care Provided by  self    Provides Primary Care For  no one    Family Caregiver if Needed  none    Quality of Family Relationships  unable to assess    Able to Return to Prior Arrangements  other (see comments)    Living Arrangement Comments  Pt states she can't stay alone at d/c. She states she may stay with her daughter or her daughter and son will need to hire caregivers for her at home for a while.        Resource/Environmental Concerns    Resource/Environmental Concerns  none    Transportation Concerns  car, none       Transition Planning    Patient/Family Anticipates Transition to  home with family;home with help/services    Patient/Family Anticipated Services at Transition  ;home health care    Transportation Anticipated  family or friend will provide       Discharge Needs Assessment    Concerns to be Addressed  discharge planning    Equipment Currently Used at Home  none    Anticipated Changes Related to Illness  inability to care for self    Equipment Needed After Discharge  none    Outpatient/Agency/Support Group Needs  homecare agency    Current Discharge Risk  lives alone;lack of support system/caregiver        Discharge Plan     Row Name 09/02/19 1126       Plan    Plan  Home with daughter or home with HH and hired caregivers.     Patient/Family in Agreement with Plan  yes    Plan Comments  Consult for d/c planning received. Met with pt at . She lives alone in [facility]. Her daughter Shireen lives in [place] and her son is in the  in [place]. She was independent at home and denies DME or HH. She states she can't go home by herself and may stay with  her daughter in [place] or have her son and daughter hire private caregivers to stay with her for a short while. PT recommended HHPT. CM will discuss with pt once she speaks to her daughter about d/c plans. CM will follow.     Final Discharge Disposition Code  30 - still a patient        Destination      No service coordination in this encounter.      Durable Medical Equipment      No service coordination in this encounter.      Dialysis/Infusion      No service coordination in this encounter.      Home Medical Care      No service coordination in this encounter.      Therapy      No service coordination in this encounter.      Community Resources      No service coordination in this encounter.          Demographic Summary     Row Name 09/02/19 1120       General Information    Referral Source  admission list    Reason for Consult  discharge planning    Preferred Language  English     Used During This Interaction  no    General Information Comments  PCP is Duane Crow       Contact Information    Permission Granted to Share Info With  ;family/designee May speak to pt's daughter Shireen Youssef ([place]) or son who lives in [place]        Functional Status     Row Name 09/02/19 1122       Functional Status    Usual Activity Tolerance  moderate    Current Activity Tolerance  fair       Functional Status, IADL    Medications  independent    Meal Preparation  independent    Housekeeping  independent    Laundry  independent    Shopping  independent       Employment/    Employment/ Comments  Has Medicare [employer] and [place] Medicaid. Has [employer] with no concerns affording medications. Uses [facility].        Psychosocial    No documentation.       Abuse/Neglect    No documentation.       Legal    No documentation.       Substance Abuse    No documentation.       Patient Forms    No documentation.           Mila Back RN

## 2019-09-02 NOTE — THERAPY EVALUATION
Acute Care - Occupational Therapy Initial Evaluation  [facility]     Patient Name: Betsy Youssef  : 1945  MRN: [id number]  Today's Date: 2019  Onset of Illness/Injury or Date of Surgery: 19  Date of Referral to OT: 19  Referring Physician: SPEEDY Dean    Admit Date: 2019       ICD-10-CM ICD-9-CM   1. Confusion R41.0 298.9   2. Near syncope R55 780.2   3. Hyponatremia E87.1 276.1   4. Dehydration E86.0 276.51   5. Impaired mobility and ADLs Z74.09 799.89   6. Impaired functional mobility, balance, gait, and endurance Z74.09 V49.89     Patient Active Problem List   Diagnosis   • Hypertensive urgency   • Hyponatremia   • Generalized weakness   • Abdominal pain     Past Medical History:   Diagnosis Date   • Anxiety    • Depression    • HLD (hyperlipidemia)    • Hypertension    • Urinary tract infection      Past Surgical History:   Procedure Laterality Date   • HYSTERECTOMY            OT ASSESSMENT FLOWSHEET (last 12 hours)      Occupational Therapy Evaluation     Row Name 19 0830                   OT Evaluation Time/Intention    Subjective Information  no complaints  -JR        Document Type  evaluation  -JR        Mode of Treatment  occupational therapy  -JR        Patient Effort  good  -JR        Symptoms Noted During/After Treatment  none  -JR           General Information    Patient Profile Reviewed?  yes  -JR        Onset of Illness/Injury or Date of Surgery  19  -JR        Referring Physician  SPEEDY Dean  -JR        Prior Level of Function  independent:;gait;transfer;bed mobility;ADL's;home management;shopping;driving;yard work  -JR        Equipment Currently Used at Home  none  -JR        Pertinent History of Current Functional Problem  Pt admitted with diffuse abdominal discomfort with constipation, fatigue, loss of appetite, dizziness, foul odor to urine and generalized weakness x 3 months following switching medicine from thorazine to abilify. CT scan also  showed possible pelvic mass. MRI Brain showed no acute intracranial abnormality, no recent infarct  -JR        Existing Precautions/Restrictions  fall  -JR        Risks Reviewed  patient and family:;increased discomfort  -JR        Benefits Reviewed  patient and family:;improve function;increase independence  -JR        Barriers to Rehab  medically complex  -JR           Relationship/Environment    Primary Source of Support/Comfort  no one  -JR        Lives With  alone  -JR           Resource/Environmental Concerns    Current Living Arrangements  home/apartment/condo  -JR           Home Main Entrance    Number of Stairs, Main Entrance  one  -JR           Cognitive Assessment/Interventions    Additional Documentation  Cognitive Assessment/Intervention (Group)  -JR           Cognitive Assessment/Intervention- PT/OT    Affect/Mental Status (Cognitive)  anxious  -JR        Orientation Status (Cognition)  oriented x 4  -JR        Follows Commands (Cognition)  follows one step commands;over 90% accuracy  -JR        Safety Deficit (Cognitive)  mild deficit;awareness of need for assistance;insight into deficits/self awareness  -           Bed Mobility Assessment/Treatment    Bed Mobility Assessment/Treatment  supine-sit  -        Supine-Sit [place] (Bed Mobility)  supervision  -        Assistive Device (Bed Mobility)  head of bed elevated  -           Functional Mobility    Functional Mobility- Ind. Level  contact guard assist  -        Functional Mobility- Device  -- L UE Support  -        Functional Mobility-Distance (Feet)  -- in hallway  -           Transfer Assessment/Treatment    Transfer Assessment/Treatment  bed-chair transfer;sit-stand transfer  -JR           Bed-Chair Transfer    Bed-Chair [place] (Transfers)  supervision  -JR           Sit-Stand Transfer    Sit-Stand [place] (Transfers)  supervision  -JR           ADL Assessment/Intervention    BADL Assessment/Intervention  upper body  dressing  -JR           Upper Body Dressing Assessment/Training    Upper Body Dressing [place] Level  don;maximum assist (25% patient effort);verbal cues robe  -JR        Upper Body Dressing Position  unsupported sitting  -JR           General ROM    GENERAL ROM COMMENTS  B UE ROM WFL  -JR           MMT (Manual Muscle Testing)    General MMT Comments  B UE functionally 4/5  -JR           Motor Assessment/Interventions    Additional Documentation  Balance (Group)  -JR           Balance    Balance  static standing balance;dynamic standing balance  -JR           Static Standing Balance    Level of [place] (Supported Standing, Static Balance)  supervision  -JR           Dynamic Standing Balance    Level of [place], Reaches Outside Midline (Standing, Dynamic Balance)  contact guard assist  -JR           Sensory Assessment/Intervention    Sensory General Assessment  no sensation deficits identified  -JR           Positioning and Restraints    Pre-Treatment Position  in bed  -JR        Post Treatment Position  other  -JR        Other Position  with other staff Pt with transport to go to test  -JR           Pain Assessment    Additional Documentation  Pain Scale: Numbers Pre/Post-Treatment (Group)  -           Pain Scale: Numbers Pre/Post-Treatment    Pain Scale: Numbers, Pretreatment  0/10 - no pain  -        Pain Scale: Numbers, Post-Treatment  0/10 - no pain  -           Plan of Care Review    Plan of Care Reviewed With  patient;family  -JR           Clinical Impression (OT)    Date of Referral to OT  09/02/19  -JR        OT Diagnosis  Decreased independence with ADL's and mobilty  -        Patient/Family Goals Statement (OT Eval)  Pt would like to return home  -JR        Criteria for Skilled Therapeutic Interventions Met (OT Eval)  yes;treatment indicated  -JR        Rehab Potential (OT Eval)  good, to achieve stated therapy goals  -JR        Therapy Frequency (OT Eval)  daily  -JR        Care  Plan Review (OT)  risks/benefits reviewed;patient/other agree to care plan  -JR        Anticipated Discharge Disposition (OT)  home with assist;home with home health  -JR           Vital Signs    Pre Systolic BP Rehab  173  -JR        Pre Treatment Diastolic BP  93  -JR        Post Systolic BP Rehab  -- post vitals not taken as pt left for test  -JR        Pretreatment Heart Rate (beats/min)  86  -JR        Pre SpO2 (%)  97  -JR        O2 Delivery Pre Treatment  room air  -JR           Planned OT Interventions    Planned Therapy Interventions (OT Eval)  activity tolerance training;adaptive equipment training;BADL retraining;occupation/activity based interventions;patient/caregiver education/training;ROM/therapeutic exercise;strengthening exercise;transfer/mobility retraining  -JR           OT Goals    Dressing Goal Selection (OT)  dressing, OT goal 1  -JR        Strength Goal Selection (OT)  strength, OT goal 1  -JR        Activity Tolerance Goal Selection (OT)  activity tolerance, OT goal 1  -JR        Safety Awareness Goal Selection (OT)  safety awareness, OT goal 1  -JR        Additional Documentation  Activity Tolerance Goal Selection (OT) (Row);Strength Goal Selection (OT) (Row);Safety Awareness Goal Selection (OT) (Row)  -JR           Dressing Goal 1 (OT)    Activity/Assistive Device (Dressing Goal 1, OT)  lower body dressing  -JR        [place]/Cues Needed (Dressing Goal 1, OT)  set-up required  -JR        Time Frame (Dressing Goal 1, OT)  long term goal (LTG);1 week  -JR        Progress/Outcome (Dressing Goal 1, OT)  goal ongoing  -JR           Strength Goal 1 (OT)    Strength Goal 1 (OT)  Pt to demo B UE AROM 15 reps to support ADL iindependence.  -JR        Time Frame (Strength Goal 1, OT)  long term goal (LTG);1 week  -JR        Progress/Outcome (Strength Goal 1, OT)  goal ongoing  -JR            Activity Tolerance Goal 1 (OT)    Activity Tolerance Goal 1 (OT)  Pt to participate with 15 minute  activity with 1 sitting rest period to support ADL independence.  -JR        Activity Level (Endurance Goal 1, OT)  15 min activity  -JR        Time Frame (Activity Tolerance Goal 1, OT)  long term goal (LTG);1 week  -JR        Progress/Outcome (Activity Tolerance Goal 1, OT)  goal ongoing  -JR           Safety Awareness Goal 1 (OT)    Activity (Safety Awareness Goal 1, OT)  demonstration of safe behaviors  -JR        [place]/Cues/Accuracy (Safety Awareness Goal 1, OT)  with minimum;verbal cues/redirection  -JR        Time Frame (Safety Awareness Goal 1, OT)  long term goal (LTG);1 week  -JR        Progress/Outcome (Safety Awareness Goal 1, OT)  goal ongoing  -JR           Living Environment    Home Accessibility  stairs to enter home  -          User Key  (r) = Recorded By, (t) = Taken By, (c) = Cosigned By    Initials Name Effective Dates    Ingris Palmer, OT 06/22/15 -          Occupational Therapy Education     Title: PT OT SLP Therapies (In Progress)     Topic: Occupational Therapy (In Progress)     Point: ADL training (In Progress)     Description: Instruct learner(s) on proper safety adaptation and remediation techniques during self care or transfers.   Instruct in proper use of assistive devices.    Learning Progress Summary           Patient Acceptance, E, NR by  at 9/2/2019  8:30 AM    Comment:  Educated pt and family regarding role of therapy and ongoing treatment plan                               User Key     Initials Effective Dates Name Provider Type Discipline     06/22/15 -  Ingris Pulido, OT Occupational Therapist OT                  OT Recommendation and Plan  Outcome Summary/Treatment Plan (OT)  Anticipated Discharge Disposition (OT): home with assist, home with home health  Planned Therapy Interventions (OT Eval): activity tolerance training, adaptive equipment training, BADL retraining, occupation/activity based interventions, patient/caregiver education/training,  ROM/therapeutic exercise, strengthening exercise, transfer/mobility retraining  Therapy Frequency (OT Eval): daily  Plan of Care Review  Plan of Care Reviewed With: patient, family  Plan of Care Reviewed With: patient, family  Outcome Summary: OT intial eval and brief chart review completed. Pt presents with multiple comorbidities and decreased independence with ADL's and mobility. Recommend continued skilled OT services and home with assist at d/c.    Outcome Measures     Row Name 09/02/19 0830 09/02/19 0815          How much help from another person do you currently need...    Turning from your back to your side while in flat bed without using bedrails?  --  4  -SJ     Moving from lying on back to sitting on the side of a flat bed without bedrails?  --  4  -SJ     Moving to and from a bed to a chair (including a wheelchair)?  --  3  -SJ     Standing up from a chair using your arms (e.g., wheelchair, bedside chair)?  --  3  -SJ     Climbing 3-5 steps with a railing?  --  3  -SJ     To walk in hospital room?  --  3  -SJ     AM-PAC 6 Clicks Score (PT)  --  20  -SJ        How much help from another is currently needed...    Putting on and taking off regular lower body clothing?  3  -JR  --     Bathing (including washing, rinsing, and drying)  3  -JR  --     Toileting (which includes using toilet bed pan or urinal)  3  -JR  --     Putting on and taking off regular upper body clothing  2  -JR  --     Taking care of personal grooming (such as brushing teeth)  3  -JR  --     Eating meals  3  -JR  --     AM-PAC 6 Clicks Score (OT)  17  -JR  --        Functional Assessment    Outcome Measure Options  AM-PAC 6 Clicks Daily Activity (OT)  -JR  AM-PAC 6 Clicks Basic Mobility (PT)  -SJ       User Key  (r) = Recorded By, (t) = Taken By, (c) = Cosigned By    Initials Name Provider Type    Linda Cohn, PT Physical Therapist    Ingris Palmer, OT Occupational Therapist          Time Calculation:   Time Calculation- OT      Row Name 09/02/19 0830             Time Calculation- OT    OT Start Time  0830  -      OT Received On  09/02/19  -      OT Goal Re-Cert Due Date  09/12/19  -        User Key  (r) = Recorded By, (t) = Taken By, (c) = Cosigned By    Initials Name Provider Type     Ingris Pulido OT Occupational Therapist        Therapy Charges for Today     Code Description Service Date Service Provider Modifiers Qty    75790044651  OT EVAL LOW COMPLEXITY 4 9/2/2019 Ingris Pulido OT GO 1               Ingris Pulido, OT  9/2/2019

## 2019-09-02 NOTE — THERAPY EVALUATION
Patient Name: Betsy Yosusef  : 1945    MRN: [id number]                              Today's Date: 2019       Admit Date: 2019    Visit Dx:     ICD-10-CM ICD-9-CM   1. Confusion R41.0 298.9   2. Near syncope R55 780.2   3. Hyponatremia E87.1 276.1   4. Dehydration E86.0 276.51   5. Impaired mobility and ADLs Z74.09 799.89   6. Impaired functional mobility, balance, gait, and endurance Z74.09 V49.89     Patient Active Problem List   Diagnosis   • Hypertensive urgency   • Hyponatremia   • Generalized weakness   • Abdominal pain     Past Medical History:   Diagnosis Date   • Anxiety    • Depression    • HLD (hyperlipidemia)    • Hypertension    • Urinary tract infection      Past Surgical History:   Procedure Laterality Date   • HYSTERECTOMY       General Information     Row Name 19 0815          PT Evaluation Time/Intention    Document Type  evaluation  -     Mode of Treatment  physical therapy  -     Row Name 19 0815          General Information    Patient Profile Reviewed?  yes  -     Prior Level of Function  independent:;all household mobility;community mobility;gait;transfer;bed mobility;ADL's;home management;driving;shopping  -     Existing Precautions/Restrictions  no known precautions/restrictions  -     Barriers to Rehab  none identified  -     Row Name 19 0815          Relationship/Environment    Lives With  alone  -     Row Name 19 0815          Resource/Environmental Concerns    Current Living Arrangements  home/apartment/condo  -     Row Name 19 0815          Home Main Entrance    Number of Stairs, Main Entrance  one  -     Row Name 19 0815          Stairs Within Home, Primary    Number of Stairs, Within Home, Primary  none  -     Row Name 19 0815          Cognitive Assessment/Intervention- PT/OT    Orientation Status (Cognition)  oriented x 4  -     Row Name 19 0815          Safety Issues, Functional Mobility    Safety  Issues Affecting Function (Mobility)  insight into deficits/self awareness  -SJ     Impairments Affecting Function (Mobility)  balance;strength  -SJ       User Key  (r) = Recorded By, (t) = Taken By, (c) = Cosigned By    Initials Name Provider Type    Linda Cohn PT Physical Therapist        Mobility     Row Name 09/02/19 0900          Bed Mobility Assessment/Treatment    Bed Mobility Assessment/Treatment  supine-sit  -SJ     Supine-Sit [place] (Bed Mobility)  conditional independence  -SJ     Assistive Device (Bed Mobility)  head of bed elevated;bed rails  -SJ     Comment (Bed Mobility)  good safety  -SJ     Row Name 09/02/19 0900          Bed-Chair Transfer    Bed-Chair [place] (Transfers)  contact guard;1 person assist  -SJ     Row Name 09/02/19 0900          Sit-Stand Transfer    Sit-Stand [place] (Transfers)  contact guard;1 person assist  -SJ     Row Name 09/02/19 0900          Gait/Stairs Assessment/Training    [place] Level (Gait)  contact guard;1 person assist  -SJ     Assistive Device (Gait)  other (see comments) HHA  -SJ     Distance in Feet (Gait)  150  -SJ     Deviations/Abnormal Patterns (Gait)  chastity decreased;gait speed decreased  -SJ     Bilateral Gait Deviations  heel strike decreased;forward flexed posture  -SJ     Comment (Gait/Stairs)  slow speed, mildly unsteady  -SJ       User Key  (r) = Recorded By, (t) = Taken By, (c) = Cosigned By    Initials Name Provider Type    Linda Cohn PT Physical Therapist        Obj/Interventions     Row Name 09/02/19 0902          General ROM    GENERAL ROM COMMENTS  BLE's WFL  -SJ     Row Name 09/02/19 0902          MMT (Manual Muscle Testing)    General MMT Comments  BLE's grossly 4+/5  -SJ     Row Name 09/02/19 0902          Static Sitting Balance    Level of [place] (Unsupported Sitting, Static Balance)  independent  -SJ     Sitting Position (Unsupported Sitting, Static Balance)  sitting on edge of bed;sitting in  chair  -SJ     [facility] 09/02/19 0902          Static Standing Balance    Level of [place] (Supported Standing, Static Balance)  standby assist;1 person assist  -[facility] 09/02/19 0902          Dynamic Standing Balance    Level of [place], Reaches Outside Midline (Standing, Dynamic Balance)  contact guard assist;1 person assist  -SJ       User Key  (r) = Recorded By, (t) = Taken By, (c) = Cosigned By    Initials Name Provider Type    Linda Cohn, PT Physical Therapist        Goals/Plan     Row Name 09/02/19 0905          Transfer Goal 1 (PT)    Activity/Assistive Device (Transfer Goal 1, PT)  sit-to-stand/stand-to-sit;bed-to-chair/chair-to-bed  -SJ     [place] Level/Cues Needed (Transfer Goal 1, PT)  independent  -SJ     Time Frame (Transfer Goal 1, PT)  long term goal (LTG);2 weeks  -SJ     Row Name 09/02/19 0905          Gait Training Goal 1 (PT)    Activity/Assistive Device (Gait Training Goal 1, PT)  gait (walking locomotion);improve balance and speed  -SJ     [place] Level (Gait Training Goal 1, PT)  independent  -SJ     Distance (Gait Goal 1, PT)  400  -SJ     Time Frame (Gait Training Goal 1, PT)  long term goal (LTG);2 weeks  -       User Key  (r) = Recorded By, (t) = Taken By, (c) = Cosigned By    Initials Name Provider Type    Linda Cohn, PT Physical Therapist        Clinical Impression     Row Name 09/02/19 0903          Pain Assessment    Additional Documentation  Pain Scale: Numbers Pre/Post-Treatment (Group)  -[facility] 09/02/19 0903          Pain Scale: Numbers Pre/Post-Treatment    Pain Scale: Numbers, Pretreatment  0/10 - no pain  -     Pain Scale: Numbers, Post-Treatment  0/10 - no pain  -SJ     Row Name 09/02/19 0903          Plan of Care Review    Plan of Care Reviewed With  patient;family  -SJ     Row Name 09/02/19 0903          Physical Therapy Clinical Impression    Patient/Family Goals Statement (PT Clinical Impression)  return to PLOF   -     Criteria for Skilled Interventions Met (PT Clinical Impression)  yes;treatment indicated  -     Rehab Potential (PT Clinical Summary)  good, to achieve stated therapy goals  -     Predicted Duration of Therapy (PT)  2wks  -     Row Name 09/02/19 0903          Vital Signs    Pre Systolic BP Rehab  173  -     Pre Treatment Diastolic BP  93  -     Pretreatment Heart Rate (beats/min)  88  -     Pre SpO2 (%)  96  -     O2 Delivery Pre Treatment  room air  -     Row Name 09/02/19 0903          Positioning and Restraints    Pre-Treatment Position  in bed  -     Post Treatment Position  other  -     Other Position  with other staff with transport  -       User Key  (r) = Recorded By, (t) = Taken By, (c) = Cosigned By    Initials Name Provider Type    Linda Cohn PT Physical Therapist        Outcome Measures    No documentation.       Physical Therapy Education     Title: PT OT SLP Therapies (In Progress)     Topic: Physical Therapy (In Progress)     Point: Mobility training (In Progress)     Learning Progress Summary           Patient Acceptance, E,D, NR by  at 9/2/2019  9:08 AM                   Point: Home exercise program (In Progress)     Learning Progress Summary           Patient Acceptance, E,D, NR by  at 9/2/2019  9:08 AM                   Point: Body mechanics (In Progress)     Learning Progress Summary           Patient Acceptance, E,D, NR by  at 9/2/2019  9:08 AM                               User Key     Initials Effective Dates Name Provider Type [facility] 06/19/15 -  Linda Mansfield PT Physical Therapist PT              PT Recommendation and Plan  Planned Therapy Interventions (PT Eval): balance training, bed mobility training, gait training, home exercise program, patient/family education, strengthening, transfer training  Plan of Care Reviewed With: patient, family  Outcome Summary: PT eval completed. Pt presents with weakness and difficulty walking per  PLOF. Pt ambulated 150ft with CGA with HHA, with slow speed, mildly unsteady. PT recommends d/c home with HHPT.     Time Calculation:   PT Charges     Row Name 09/02/19 0815             Time Calculation    Start Time  0815  -      PT Received On  09/02/19  -      PT Goal Re-Cert Due Date  09/12/19  -        User Key  (r) = Recorded By, (t) = Taken By, (c) = Cosigned By    Initials Name Provider Type     Linda Mansfield PT Physical Therapist        Therapy Charges for Today     Code Description Service Date Service Provider Modifiers Qty    49056555629 HC PT EVAL MOD COMPLEXITY 4 9/2/2019 Linda Mansfield, PT GP 1          PT G-Codes  Outcome Measure Options: AM-PAC 6 Clicks Basic Mobility (PT)  AM-PAC 6 Clicks Score (PT): 20    Linda Mansfield PT  9/2/2019

## 2019-09-02 NOTE — PROGRESS NOTES
"                  Clinical Nutrition     Reason for Visit:   Identified at risk by screening criteria, MST score 2+      Patient Name: Betsy Youssef  YOB: 1945  MRN: [id number]  Date of Encounter: 09/02/19 1:48 PM  Admission date: 9/2/2019    Comments: Patient to be seen for MST score of 2, unintentional weight loss. Patient reports UBW ~155 - 160 lbs and confirms decreased oral intake <50% of normal amounts for the past 3 months. Measured weight ordered in attempt to complete malnutrition assessment. Will continue to follow per protocol.    Nutrition Assessment   Assessment     Admission diagnosis  General weakness    Additional diagnosis/conditions/procedures  Abdominal pain  Nausea  CT - pelvic mass  ?Psychiatric condition  Constipation  Hyponatremia    Additional PMH/procedures:  HLP  HTN  Anxiety  Depression      Reported/Observed/Food/Nutrition Related History:      Patient and sister in room at visit. Patient confirms decreased appetite of less than 50% of normal intake for past 3 months since her medications were adjusted. She states her UBW is ~155 - 160 lbs. She reports she is a good cook and would always cook for herself / eat three meals a day, sometimes snacking in between. Family in room confirms good oral intake. Patient very anxious during visit. States her doctors wanted her to adjust her diet and eat less sodium to help with her blood pressure but when her medications were adjusted she did not have any appetite to eat at all, and was maybe eating bites - 1 meal daily. She did not drink any nutritional supplements but would like to have them now. Patient initially told me she ate well for breakfast this morning, describing how much she ate of tray, but then requested nutritional supplement immediately since she \"did not eat much for breakfast.\" Will order measured weight in attempt to confirm weight history.      Anthropometrics     Height: 154.9 cm (61\")  Last filed wt: Weight: 73 kg " (161 lb) (09/02/19 0037)  Weight Method: Stated    BMI: BMI (Calculated): 30.42 kg/m²  Obese Class I: 30-34.9kg/m2    Ideal Body Weight (IBW) (kg): 48.15  Admission wt: 161 lbs   Method obtained: stated weight per charting on admission      Weight Change   UBW: ~155 - 160 lbs per patient  Weight change: unable to determine at this time     Labs reviewed     Results from last 7 days   Lab Units 09/02/19  0654 09/02/19  0057   GLUCOSE mg/dL 123* 141*   BUN mg/dL 6* 7*   CREATININE mg/dL 0.73 0.82   SODIUM mmol/L 130* 129*   CHLORIDE mmol/L 93* 92*   POTASSIUM mmol/L 4.0 3.9   MAGNESIUM mg/dL  --  2.2   ALT (SGPT) U/L  --  17     Results from last 7 days   Lab Units 09/02/19  0057   ALBUMIN g/dL 4.60       Medications reviewed   Pertinent: thorazine, colace, miralax  GTT: NaCl @ 50 mL/hr      Current Nutrition Prescription     PO: Diet Regular; Cardiac  Intake: 25% x 1 meal    Nutrition Diagnosis     9/2  Problem Inadequate oral intake   Etiology Decreased appetite   Signs/Symptoms Report of minimal PO intake x3 months prior to admission       Nutrition Intervention   1.  Follow treatment progress, Care plan reviewed  2.  Advise alternate selection, Interview for preferences   3. Supplement ordered - Chocolate Ensure HP x2/d  4. Encouraged oral / supplemental intake  5. Ordered standing scale weight to confirm current weight status/potential weight loss vs patient reported UBW    Goal:   General: Nutrition support treatment  PO: Establish PO, Increase intake      Monitoring/Evaluation:   Per protocol, PO intake, Supplement intake, Weight, Symptoms    Will Continue to follow per protocol    Linda Min RDN, LD  Time Spent: 35 minutes

## 2019-09-02 NOTE — PLAN OF CARE
Problem: Patient Care Overview  Goal: Plan of Care Review  Outcome: Ongoing (interventions implemented as appropriate)   09/02/19 0906   Coping/Psychosocial   Plan of Care Reviewed With patient;family   OTHER   Outcome Summary PT eval completed. Pt presents with weakness and difficulty walking per PLOF. Pt ambulated 150ft with CGA with HHA, with slow speed, mildly unsteady. PT recommends d/c home with HHPT.

## 2019-09-02 NOTE — PROGRESS NOTES
Patient was admitted overnight by my partners.  H&P has been reviewed.  Patient has been seen and examined during rounds this morning.  In brief, she is a 74-year-old female with history of anxiety and depression, hypertension, underlying psychiatric disorder who had been on Thorazine for 50 years and recently switched to Abilify.  She presented with 3 months of constant nausea and abdominal discomfort, associated poor p.o. intake, constipation and 10 pound weight loss, all the symptoms began after the cessation of Thorazine.  Patient was found to be in hypertensive urgency, with suspected pelvic mass and also to have hyponatremia.  This morning BP better, sodium with marginal improvement, she states that she is not feeling well. We will restart her thorazine here, she will need close f/u with psyche at d/c. Otherwise continue plan of care as per admission H&P.

## 2019-09-02 NOTE — PLAN OF CARE
Problem: Patient Care Overview  Goal: Plan of Care Review  Outcome: Ongoing (interventions implemented as appropriate)   09/02/19 4518   Coping/Psychosocial   Plan of Care Reviewed With patient   Plan of Care Review   Progress no change   OTHER   Outcome Summary Pt has been tachycardic in the 130's-140's today with some hypotension. Gave her a 1L NS bolus and started maintenance IVF. Gave some IV ativan for anxiousness and restarted her Thorazine. Will continue to monitor.

## 2019-09-02 NOTE — PLAN OF CARE
Problem: Patient Care Overview  Goal: Plan of Care Review  Outcome: Ongoing (interventions implemented as appropriate)   09/02/19 1866   Coping/Psychosocial   Plan of Care Reviewed With patient;family   OTHER   Outcome Summary OT intial eval and brief chart review completed. Pt presents with multiple comorbidities and decreased independence with ADL's and mobility. Recommend continued skilled OT services and home with assist at d/c.

## 2019-09-02 NOTE — PLAN OF CARE
Problem: Patient Care Overview  Goal: Plan of Care Review  Outcome: Ongoing (interventions implemented as appropriate)   09/02/19 0521   Coping/Psychosocial   Plan of Care Reviewed With patient   Plan of Care Review   Progress no change   OTHER   Outcome Summary Pt came up from Ed. BP in 180's. RA. SR on monitor. c/o of abdominal discomfort - prn zofran given. pt alert and oriented. will continue to monitor.

## 2019-09-03 ENCOUNTER — APPOINTMENT (OUTPATIENT)
Dept: CARDIOLOGY | Facility: HOSPITAL | Age: 74
End: 2019-09-03

## 2019-09-03 LAB
ANION GAP SERPL CALCULATED.3IONS-SCNC: 10 MMOL/L (ref 5–15)
BH CV ECHO MEAS - AI DEC SLOPE: 257.8 CM/SEC^2
BH CV ECHO MEAS - AI MAX PG: 60.1 MMHG
BH CV ECHO MEAS - AI MAX VEL: 387.7 CM/SEC
BH CV ECHO MEAS - AI P1/2T: 440.5 MSEC
BH CV ECHO MEAS - AO ROOT AREA (BSA CORRECTED): 1.7
BH CV ECHO MEAS - AO ROOT AREA: 6.9 CM^2
BH CV ECHO MEAS - AO ROOT DIAM: 3 CM
BH CV ECHO MEAS - BSA(HAYCOCK): 1.8 M^2
BH CV ECHO MEAS - BSA: 1.7 M^2
BH CV ECHO MEAS - BZI_BMI: 30.4 KILOGRAMS/M^2
BH CV ECHO MEAS - BZI_METRIC_HEIGHT: 154.9 CM
BH CV ECHO MEAS - BZI_METRIC_WEIGHT: 73 KG
BH CV ECHO MEAS - EDV(CUBED): 31.7 ML
BH CV ECHO MEAS - EDV(MOD-SP2): 55 ML
BH CV ECHO MEAS - EDV(MOD-SP4): 44 ML
BH CV ECHO MEAS - EDV(TEICH): 39.9 ML
BH CV ECHO MEAS - EF(CUBED): 48 %
BH CV ECHO MEAS - EF(MOD-BP): 69 %
BH CV ECHO MEAS - EF(MOD-SP2): 65.5 %
BH CV ECHO MEAS - EF(MOD-SP4): 70.5 %
BH CV ECHO MEAS - EF(TEICH): 41.5 %
BH CV ECHO MEAS - ESV(CUBED): 16.5 ML
BH CV ECHO MEAS - ESV(MOD-SP2): 19 ML
BH CV ECHO MEAS - ESV(MOD-SP4): 13 ML
BH CV ECHO MEAS - ESV(TEICH): 23.3 ML
BH CV ECHO MEAS - FS: 19.6 %
BH CV ECHO MEAS - IVS/LVPW: 1
BH CV ECHO MEAS - IVSD: 1.3 CM
BH CV ECHO MEAS - LA DIMENSION: 3.8 CM
BH CV ECHO MEAS - LA/AO: 1.3
BH CV ECHO MEAS - LAD MAJOR: 5.5 CM
BH CV ECHO MEAS - LAT PEAK E' VEL: 8.4 CM/SEC
BH CV ECHO MEAS - LATERAL E/E' RATIO: 10.7
BH CV ECHO MEAS - LV DIASTOLIC VOL/BSA (35-75): 25.5 ML/M^2
BH CV ECHO MEAS - LV MASS(C)D: 131.7 GRAMS
BH CV ECHO MEAS - LV MASS(C)DI: 76.4 GRAMS/M^2
BH CV ECHO MEAS - LV SYSTOLIC VOL/BSA (12-30): 7.5 ML/M^2
BH CV ECHO MEAS - LVIDD: 3.2 CM
BH CV ECHO MEAS - LVIDS: 2.2 CM
BH CV ECHO MEAS - LVLD AP2: 7.5 CM
BH CV ECHO MEAS - LVLD AP4: 7 CM
BH CV ECHO MEAS - LVLS AP2: 5.9 CM
BH CV ECHO MEAS - LVLS AP4: 5.9 CM
BH CV ECHO MEAS - LVPWD: 1.3 CM
BH CV ECHO MEAS - MED PEAK E' VEL: 5.5 CM/SEC
BH CV ECHO MEAS - MEDIAL E/E' RATIO: 16.3
BH CV ECHO MEAS - MV A MAX VEL: 117 CM/SEC
BH CV ECHO MEAS - MV DEC TIME: 0.12 SEC
BH CV ECHO MEAS - MV E MAX VEL: 91.8 CM/SEC
BH CV ECHO MEAS - MV E/A: 0.78
BH CV ECHO MEAS - PA ACC SLOPE: 503 CM/SEC^2
BH CV ECHO MEAS - PA ACC TIME: 0.12 SEC
BH CV ECHO MEAS - PA PR(ACCEL): 25.1 MMHG
BH CV ECHO MEAS - RAP SYSTOLE: 3 MMHG
BH CV ECHO MEAS - RVSP: 26 MMHG
BH CV ECHO MEAS - SI(CUBED): 8.8 ML/M^2
BH CV ECHO MEAS - SI(MOD-SP2): 20.9 ML/M^2
BH CV ECHO MEAS - SI(MOD-SP4): 18 ML/M^2
BH CV ECHO MEAS - SI(TEICH): 9.6 ML/M^2
BH CV ECHO MEAS - SV(CUBED): 15.2 ML
BH CV ECHO MEAS - SV(MOD-SP2): 36 ML
BH CV ECHO MEAS - SV(MOD-SP4): 31 ML
BH CV ECHO MEAS - SV(TEICH): 16.5 ML
BH CV ECHO MEAS - TAPSE (>1.6): 1.7 CM2
BH CV ECHO MEAS - TR MAX PG: 23 MMHG
BH CV ECHO MEAS - TR MAX VEL: 238.3 CM/SEC
BH CV ECHO MEASUREMENTS AVERAGE E/E' RATIO: 13.21
BH CV VAS BP RIGHT ARM: NORMAL MMHG
BH CV XLRA - RV BASE: 3.2 CM
BH CV XLRA - RV LENGTH: 7 CM
BH CV XLRA - RV MID: 2.6 CM
BH CV XLRA - TDI S': 7.7 CM/SEC
BILIRUB UR QL STRIP: NEGATIVE
BUN BLD-MCNC: 6 MG/DL (ref 8–23)
BUN/CREAT SERPL: 9.1 (ref 7–25)
CALCIUM SPEC-SCNC: 8.2 MG/DL (ref 8.6–10.5)
CHLORIDE SERPL-SCNC: 101 MMOL/L (ref 98–107)
CLARITY UR: CLEAR
CO2 SERPL-SCNC: 23 MMOL/L (ref 22–29)
COLOR UR: YELLOW
CREAT BLD-MCNC: 0.66 MG/DL (ref 0.57–1)
GFR SERPL CREATININE-BSD FRML MDRD: 88 ML/MIN/1.73
GLUCOSE BLD-MCNC: 105 MG/DL (ref 65–99)
GLUCOSE UR STRIP-MCNC: NEGATIVE MG/DL
HGB UR QL STRIP.AUTO: ABNORMAL
KETONES UR QL STRIP: NEGATIVE
LEFT ATRIUM VOLUME INDEX: 30.8 ML/M^2
LEFT ATRIUM VOLUME: 53 ML
LEUKOCYTE ESTERASE UR QL STRIP.AUTO: NEGATIVE
LV EF 2D ECHO EST: 68 %
MAXIMAL PREDICTED HEART RATE: 146 BPM
NITRITE UR QL STRIP: NEGATIVE
PH UR STRIP.AUTO: 8 [PH] (ref 5–8)
POTASSIUM BLD-SCNC: 3.8 MMOL/L (ref 3.5–5.2)
PROT UR QL STRIP: NEGATIVE
SODIUM BLD-SCNC: 134 MMOL/L (ref 136–145)
SP GR UR STRIP: <=1.005 (ref 1–1.03)
STRESS TARGET HR: 124 BPM
UROBILINOGEN UR QL STRIP: ABNORMAL

## 2019-09-03 PROCEDURE — 97116 GAIT TRAINING THERAPY: CPT

## 2019-09-03 PROCEDURE — 80048 BASIC METABOLIC PNL TOTAL CA: CPT | Performed by: INTERNAL MEDICINE

## 2019-09-03 PROCEDURE — 93306 TTE W/DOPPLER COMPLETE: CPT | Performed by: INTERNAL MEDICINE

## 2019-09-03 PROCEDURE — 97535 SELF CARE MNGMENT TRAINING: CPT

## 2019-09-03 PROCEDURE — 81003 URINALYSIS AUTO W/O SCOPE: CPT | Performed by: INTERNAL MEDICINE

## 2019-09-03 PROCEDURE — 93306 TTE W/DOPPLER COMPLETE: CPT

## 2019-09-03 PROCEDURE — 99233 SBSQ HOSP IP/OBS HIGH 50: CPT | Performed by: INTERNAL MEDICINE

## 2019-09-03 PROCEDURE — 25010000002 ENOXAPARIN PER 10 MG: Performed by: NURSE PRACTITIONER

## 2019-09-03 PROCEDURE — 97110 THERAPEUTIC EXERCISES: CPT

## 2019-09-03 RX ORDER — CHLORPROMAZINE HYDROCHLORIDE 50 MG/1
50 TABLET, FILM COATED ORAL NIGHTLY
Status: DISCONTINUED | OUTPATIENT
Start: 2019-09-03 | End: 2019-09-04 | Stop reason: HOSPADM

## 2019-09-03 RX ADMIN — ENOXAPARIN SODIUM 40 MG: 40 INJECTION SUBCUTANEOUS at 08:07

## 2019-09-03 RX ADMIN — METOPROLOL TARTRATE 25 MG: 25 TABLET ORAL at 22:19

## 2019-09-03 RX ADMIN — METOPROLOL TARTRATE 25 MG: 25 TABLET ORAL at 17:43

## 2019-09-03 RX ADMIN — DOCUSATE SODIUM 100 MG: 100 CAPSULE, LIQUID FILLED ORAL at 22:19

## 2019-09-03 RX ADMIN — CHLORPROMAZINE HYDROCHLORIDE 50 MG: 50 TABLET, SUGAR COATED ORAL at 22:18

## 2019-09-03 RX ADMIN — SODIUM CHLORIDE, PRESERVATIVE FREE 10 ML: 5 INJECTION INTRAVENOUS at 22:22

## 2019-09-03 RX ADMIN — SODIUM CHLORIDE 100 ML/HR: 9 INJECTION, SOLUTION INTRAVENOUS at 10:04

## 2019-09-03 RX ADMIN — PRAVASTATIN SODIUM 20 MG: 20 TABLET ORAL at 22:18

## 2019-09-03 RX ADMIN — DOCUSATE SODIUM 100 MG: 100 CAPSULE, LIQUID FILLED ORAL at 08:07

## 2019-09-03 RX ADMIN — POLYETHYLENE GLYCOL 3350 17 G: 17 POWDER, FOR SOLUTION ORAL at 08:07

## 2019-09-03 NOTE — PLAN OF CARE
Problem: Patient Care Overview  Goal: Plan of Care Review  Outcome: Ongoing (interventions implemented as appropriate)   09/03/19 3661   Plan of Care Review   Progress improving   OTHER   Outcome Summary ADLs: SUP. Functional Mobility and Transfers (in room): SUP. Limiting Factors: medical. Recommended D/C: home w/assist & HH. Will cont to observe and address ADL/IADL deficitis as needed.

## 2019-09-03 NOTE — PLAN OF CARE
Problem: Patient Care Overview  Goal: Plan of Care Review  Outcome: Ongoing (interventions implemented as appropriate)   09/03/19 4584   Coping/Psychosocial   Plan of Care Reviewed With patient   Plan of Care Review   Progress improving   OTHER   Outcome Summary vss. nsr on monitor. iv fluids discontinue today. may be discharged home tomorrow. will continue to [employer],.       Problem: Fall Risk (Adult)  Goal: Identify Related Risk Factors and Signs and Symptoms  Outcome: Ongoing (interventions implemented as appropriate)    Goal: Absence of Fall  Outcome: Ongoing (interventions implemented as appropriate)      Problem: Hypertensive Disease/Crisis (Arterial) (Adult)  Goal: Signs and Symptoms of Listed Potential Problems Will be Absent, Minimized or Managed (Hypertensive Disease/Crisis)  Outcome: Ongoing (interventions implemented as appropriate)

## 2019-09-03 NOTE — THERAPY TREATMENT NOTE
Patient Name: Betsy Youssef  : 1945    MRN: [id number]                              Today's Date: 9/3/2019       Admit Date: 2019    Visit Dx:     ICD-10-CM ICD-9-CM   1. Confusion R41.0 298.9   2. Near syncope R55 780.2   3. Hyponatremia E87.1 276.1   4. Dehydration E86.0 276.51   5. Impaired mobility and ADLs Z74.09 799.89   6. Impaired functional mobility, balance, gait, and endurance Z74.09 V49.89     Patient Active Problem List   Diagnosis   • Hypertensive urgency   • Hyponatremia   • Generalized weakness   • Abdominal pain   • Confusion     Past Medical History:   Diagnosis Date   • Anxiety    • Depression    • HLD (hyperlipidemia)    • Hypertension    • Urinary tract infection      Past Surgical History:   Procedure Laterality Date   • HYSTERECTOMY       General Information     Row Name 19 1300          PT Evaluation Time/Intention    Document Type  therapy note (daily note)  -MB     Mode of Treatment  physical therapy  -MB     Row Name 19 1300          General Information    Patient Profile Reviewed?  yes  -MB     Existing Precautions/Restrictions  fall  -MB     Row Name 19 1300          Cognitive Assessment/Intervention- PT/OT    Orientation Status (Cognition)  oriented x 4  -MB     Row Name 19 1300          Safety Issues, Functional Mobility    Impairments Affecting Function (Mobility)  balance;strength  -MB       User Key  (r) = Recorded By, (t) = Taken By, (c) = Cosigned By    Initials Name Provider Type    Jennifer Melendez, PT Physical Therapist        Mobility     Row Name 19 1300          Bed Mobility Assessment/Treatment    Comment (Bed Mobility)  Pt. received sitting UIC.   -MB     Row Name 19 1300          Transfer Assessment/Treatment    Comment (Transfers)  STS from recliner/toilet w/ VCs for safe hand placement; no LOB noted.   -MB     Row Name 19 1300          Sit-Stand Transfer    Sit-Stand [place] (Transfers)  supervision;verbal  cues  -MB     Assistive Device (Sit-Stand Transfers)  -- no AD  -MB     Row Name 09/03/19 1300          Gait/Stairs Assessment/Training    [place] Level (Gait)  stand by assist;verbal cues  -MB     Assistive Device (Gait)  -- HHA  -MB     Distance in Feet (Gait)  350  -MB     Deviations/Abnormal Patterns (Gait)  chastity decreased;gait speed decreased  -MB     Bilateral Gait Deviations  forward flexed posture  -MB     Comment (Gait/Stairs)  Pt. demo step through gait pattern w/ slower pace.  Pt. mildly unsteady w/ direction changes; may benefit from straight cane.   -MB       User Key  (r) = Recorded By, (t) = Taken By, (c) = Cosigned By    Initials Name Provider Type    eJnnifer Melendez, PT Physical Therapist        Obj/Interventions     Row Name 09/03/19 1300          Therapeutic Exercise    Upper Extremity Range of Motion (Therapeutic Exercise)  shoulder flexion/extension, bilateral;shoulder abduction/adduction, bilateral  -MB     Lower Extremity (Therapeutic Exercise)  LAQ (long arc quad), bilateral;marching while seated;gluteal sets  -MB     Lower Extremity Range of Motion (Therapeutic Exercise)  hip flexion/extension, bilateral;ankle dorsiflexion/plantar flexion, bilateral;hip abduction/adduction, bilateral  -MB     Exercise Type (Therapeutic Exercise)  AROM (active range of motion)  -MB     Position (Therapeutic Exercise)  seated  -MB     Sets/Reps (Therapeutic Exercise)  1/20  -MB     Expected Outcome (Therapeutic Exercise)  facilitate normal movement patterns;improve functional stability;improve functional tolerance, household activity;improve performance, gait skills;improve performance, transfer skills  -MB     Row Name 09/03/19 1300          Static Sitting Balance    Level of [place] (Unsupported Sitting, Static Balance)  independent  -MB     Sitting Position (Unsupported Sitting, Static Balance)  sitting in chair  -MB     Time Able to Maintain Position (Unsupported Sitting, Static  Balance)  3 to 4 minutes  -MB     Row Name 09/03/19 1300          Dynamic Sitting Balance    Level of [place], Reaches Outside Midline (Sitting, Dynamic Balance)  conditional independence  -MB     Sitting Position, Reaches Outside Midline (Sitting, Dynamic Balance)  other (see comments) toilet  -MB     Row Name 09/03/19 1300          Static Standing Balance    Level of [place] (Supported Standing, Static Balance)  supervision  -MB     Time Able to Maintain Position (Supported Standing, Static Balance)  2 to 3 minutes  -MB     Assistive Device Utilized (Supported Standing, Static Balance)  -- no AD  -MB     Row Name 09/03/19 1300          Dynamic Standing Balance    Level of [place], Reaches Outside Midline (Standing, Dynamic Balance)  standby assist  -MB     Time Able to Maintain Position, Reaches Outside Midline (Standing, Dynamic Balance)  more than 5 minutes  -MB     Assistive Device Utilized (Supported Standing, Dynamic Balance)  -- at sink performing ADLs  -MB       User Key  (r) = Recorded By, (t) = Taken By, (c) = Cosigned By    Initials Name Provider Type    Jennifer Melendez, PT Physical Therapist        Goals/Plan    No documentation.       Clinical Impression     Row Name 09/03/19 1300          Pain Scale: Numbers Pre/Post-Treatment    Pain Scale: Numbers, Pretreatment  0/10 - no pain  -MB     Pain Scale: Numbers, Post-Treatment  0/10 - no pain  -MB     Row Name 09/03/19 1300          Vital Signs    Pre Systolic BP Rehab  -- VSS.  RN cleared for PT.  -MB     O2 Delivery Pre Treatment  room air  -MB     O2 Delivery Intra Treatment  room air  -MB     Post SpO2 (%)  97  -MB     O2 Delivery Post Treatment  room air  -MB     Pre Patient Position  Sitting  -MB     Intra Patient Position  Standing  -MB     Post Patient Position  Sitting  -MB     Row Name 09/03/19 1300          Positioning and Restraints    Pre-Treatment Position  sitting in chair/recliner  -MB     Post Treatment Position   chair  -MB     In Chair  notified nsg;reclined;call light within reach;encouraged to call for assist;legs elevated  -MB       User Key  (r) = Recorded By, (t) = Taken By, (c) = Cosigned By    Initials Name Provider Type    Jennifer Melendez, PT Physical Therapist        Outcome Measures    No documentation.       Physical Therapy Education     Title: PT OT SLP Therapies (In Progress)     Topic: Physical Therapy (In Progress)     Point: Mobility training (In Progress)     Learning Progress Summary           Patient Acceptance, E,D, NR by  at 9/2/2019  9:08 AM                   Point: Home exercise program (In Progress)     Learning Progress Summary           Patient Acceptance, E,D, NR by  at 9/2/2019  9:08 AM                   Point: Body mechanics (In Progress)     Learning Progress Summary           Patient Acceptance, E,D, NR by  at 9/2/2019  9:08 AM                               User Key     Initials Effective Dates Name Provider Type Discipline     06/19/15 -  Linda Mansfield, BEAU Physical Therapist PT              PT Recommendation and Plan     Plan of Care Reviewed With: patient  Progress: improving  Outcome Summary: Pt. demonstrates improved safety and independence w/ mobility, performing transfers w/ supervision and progressing forward ambulation to 350 ft. w/ HHA, mildly unsteady.  Pt. may benefit from straight cane for increased safety/functional endurance w/ gait.  Recommend cont IPPT per POC.     Time Calculation:   PT Charges     Row Name 09/03/19 1422             Time Calculation    Start Time  1300  -MB      PT Received On  09/03/19  -MB      PT Goal Re-Cert Due Date  09/12/19  -MB         Time Calculation- PT    Total Timed Code Minutes- PT  45 minute(s)  -MB         Timed Charges    16877 - PT Therapeutic Exercise Minutes  25  -MB      36855 - Gait Training Minutes   20  -MB        User Key  (r) = Recorded By, (t) = Taken By, (c) = Cosigned By    Initials Name Provider Type    MB  Jennifer Cortez, PT Physical Therapist        Therapy Charges for Today     Code Description Service Date Service Provider Modifiers Qty    93774028505 HC PT THER PROC EA 15 MIN 9/3/2019 Jennifer Cortez, PT GP 2    98240734418 HC GAIT TRAINING EA 15 MIN 9/3/2019 Jennifer Cortez, PT GP 1          PT G-Codes  Outcome Measure Options: AM-PAC 6 Clicks Basic Mobility (PT)  AM-PAC 6 Clicks Score (PT): 21  AM-PAC 6 Clicks Score (OT): 17    Jennifer Cortez, PT  9/3/2019

## 2019-09-03 NOTE — THERAPY TREATMENT NOTE
Acute Care - Occupational Therapy Treatment Note  [facility]     Patient Name: Betsy Youssef  : 1945  MRN: [id number]  Today's Date: 9/3/2019  Onset of Illness/Injury or Date of Surgery: 19  Date of Referral to OT: 19  Referring Physician: SPEEDY Dean    Admit Date: 2019       ICD-10-CM ICD-9-CM   1. Confusion R41.0 298.9   2. Near syncope R55 780.2   3. Hyponatremia E87.1 276.1   4. Dehydration E86.0 276.51   5. Impaired mobility and ADLs Z74.09 799.89   6. Impaired functional mobility, balance, gait, and endurance Z74.09 V49.89     Patient Active Problem List   Diagnosis   • Hypertensive urgency   • Hyponatremia   • Generalized weakness   • Abdominal pain   • Confusion     Past Medical History:   Diagnosis Date   • Anxiety    • Depression    • HLD (hyperlipidemia)    • Hypertension    • Urinary tract infection      Past Surgical History:   Procedure Laterality Date   • HYSTERECTOMY         Therapy Treatment    Rehabilitation Treatment Summary     Row Name 19 1505             Treatment Time/Intention    Discipline  occupational therapist  -KA      Document Type  therapy note (daily note)  -KA      Subjective Information  no complaints  -KA      Mode of Treatment  occupational therapy  -KA      Therapy Frequency (OT Eval)  daily  -KA      Patient Effort  excellent  -KA      Existing Precautions/Restrictions  fall  -KA      Recorded by [KA] Noa Keita OT 19 1528      Row Name 19 1505             Vital Signs    Pre Systolic BP Rehab  141  -KA      Pre Treatment Diastolic BP  69  -KA      Pretreatment Heart Rate (beats/min)  94  -KA      Posttreatment Heart Rate (beats/min)  96  -KA2      Pre SpO2 (%)  96  -KA3      O2 Delivery Pre Treatment  room air  -KA3      Post SpO2 (%)  98  -KA3      O2 Delivery Post Treatment  room air  -KA3      Recorded by [KA] Noa Keita OT 19 1528  [KA2] Noa Keita OT 19 1533  [KA3] Noa Keita OT  09/03/19 1540      Row Name 09/03/19 1505             Transfer Assessment/Treatment    Transfer Assessment/Treatment  sit-stand transfer;stand-sit transfer  -KA      Recorded by [KA] Noa Keita OT 09/03/19 1528      Row Name 09/03/19 1505             Sit-Stand Transfer    Sit-Stand [place] (Transfers)  supervision  -KA      Recorded by [KA] Noa Keita OT 09/03/19 1528      Row Name 09/03/19 1505             Stand-Sit Transfer    Stand-Sit [place] (Transfers)  supervision  -KA      Recorded by [KA] Noa Keita, OT 09/03/19 1528      Row Name 09/03/19 1505             ADL Assessment/Intervention    09137 - OT Self Care/Mgmt Minutes  35  -KA      BADL Assessment/Intervention  upper body dressing;lower body dressing;grooming;toileting  -KA      Recorded by [KA] Noa Keita OT 09/03/19 1528      Row Name 09/03/19 1505             Upper Body Dressing Assessment/Training    Upper Body Dressing [place] Level  doff;don;front opening garment;conditional independence  -KA      Upper Body Dressing Position  unsupported standing  -KA      Recorded by [KA] Noa Keita OT 09/03/19 1528      Row Name 09/03/19 1505             Lower Body Dressing Assessment/Training    Lower Body Dressing [place] Level  doff;don;pants/bottoms;socks;set up  -KA      Lower Body Dressing Position  unsupported sitting;unsupported standing  -KA      Recorded by [KA] Noa Keita, OT 09/03/19 1528      Row Name 09/03/19 1505             Grooming Assessment/Training    [place] Level (Grooming)  hair care, combing/brushing;oral care regimen;wash face, hands;set up  -KA      Grooming Position  unsupported standing  -KA      Recorded by [KA] Noa Keita OT 09/03/19 1528      Row Name 09/03/19 1505             Toileting Assessment/Training    [place] Level (Toileting)  toileting skills;set up  -KA      Toileting Position  unsupported sitting;unsupported standing  -KA      Recorded by  [KA] Noa Keita, OT 09/03/19 1528      Row Name 09/03/19 1505             Pain Scale: Numbers Pre/Post-Treatment    Pain Scale: Numbers, Pretreatment  0/10 - no pain  -KA      Pain Scale: Numbers, Post-Treatment  0/10 - no pain  -KA      Recorded by [KA] Noa Keita, OT 09/03/19 1528      Row Name 09/03/19 1505             Outcome Summary/Treatment Plan (OT)    Daily Summary of Progress (OT)  progress toward functional goals is good  -KA      Anticipated Discharge Disposition (OT)  home with assist;home with home health  -KA2      Recorded by [KA] Noa Keita, OT 09/03/19 1528  [KA2] Naz Keitaen, OT 09/03/19 1531        User Key  (r) = Recorded By, (t) = Taken By, (c) = Cosigned By    Initials Name Effective Dates Discipline    KA Noa Keita, OT 07/17/19 -  OT             Occupational Therapy Education     Title: PT OT SLP Therapies (In Progress)     Topic: Occupational Therapy (In Progress)     Point: ADL training (Done)     Description: Instruct learner(s) on proper safety adaptation and remediation techniques during self care or transfers.   Instruct in proper use of assistive devices.    Learning Progress Summary           Patient Acceptance, E,D, VU,DU by NBA at 9/3/2019  3:05 PM    Comment:  Pt educarted on safety, ADLs, energy conservation, fall prevention and POC.    Acceptance, E, NR by  at 9/2/2019  8:30 AM    Comment:  Educated pt and family regarding role of therapy and ongoing treatment plan                   Point: Precautions (Done)     Description: Instruct learner(s) on prescribed precautions during self-care and functional transfers.    Learning Progress Summary           Patient Acceptance, E,D, VU,DU by NBA at 9/3/2019  3:05 PM    Comment:  Pt educarted on safety, ADLs, energy conservation, fall prevention and POC.                   Point: Body mechanics (Done)     Description: Instruct learner(s) on proper positioning and spine alignment during self-care,  functional mobility activities and/or exercises.    Learning Progress Summary           Patient Acceptance, E,D, VU,DU by NBA at 9/3/2019  3:05 PM    Comment:  Pt educarted on safety, ADLs, energy conservation, fall prevention and POC.                               User Key     Initials Effective Dates Name Provider Type Discipline     06/22/15 -  Ingris Pulido, OT Occupational Therapist OT    NBA 07/17/19 -  Noa Keita OT Occupational Therapist OT                OT Recommendation and Plan  Outcome Summary/Treatment Plan (OT)  Daily Summary of Progress (OT): progress toward functional goals is good  Anticipated Discharge Disposition (OT): home with assist, home with home health  Therapy Frequency (OT Eval): daily  Daily Summary of Progress (OT): progress toward functional goals is good  Outcome Summary: ADLs: SUP. Functional Mobility and Transfers (in room): SUP. Limiting Factors: medical.  Recommended D/C: home w/assist & HH.  Will cont to observe and address ADL/IADL deficitis as needed.   Outcome Measures     Row Name 09/03/19 1505 09/03/19 1300 09/02/19 0830       How much help from another person do you currently need...    Turning from your back to your side while in flat bed without using bedrails?  --  4  -MB  --    Moving from lying on back to sitting on the side of a flat bed without bedrails?  --  4  -MB  --    Moving to and from a bed to a chair (including a wheelchair)?  --  4  -MB  --    Standing up from a chair using your arms (e.g., wheelchair, bedside chair)?  --  3  -MB  --    Climbing 3-5 steps with a railing?  --  3  -MB  --    To walk in hospital room?  --  3  -MB  --    AM-PAC 6 Clicks Score (PT)  --  21  -MB  --       How much help from another is currently needed...    Putting on and taking off regular lower body clothing?  4  -KA  --  3  -JR    Bathing (including washing, rinsing, and drying)  4  -KA  --  3  -JR    Toileting (which includes using toilet bed pan or urinal)  4  -KA   --  3  -JR    Putting on and taking off regular upper body clothing  4  -KA  --  2  -JR    Taking care of personal grooming (such as brushing teeth)  4  -KA  --  3  -JR    Eating meals  4  -KA  --  3  -JR    AM-PAC 6 Clicks Score (OT)  24  -KA  --  17  -JR       Functional Assessment    Outcome Measure Options  --  AM-PAC 6 Clicks Basic Mobility (PT)  -MB  AM-[facility] 6 Clicks Daily Activity (OT)  -JR    Row Name 09/02/19 0815             How much help from another person do you currently need...    Turning from your back to your side while in flat bed without using bedrails?  4  -SJ      Moving from lying on back to sitting on the side of a flat bed without bedrails?  4  -SJ      Moving to and from a bed to a chair (including a wheelchair)?  3  -SJ      Standing up from a chair using your arms (e.g., wheelchair, bedside chair)?  3  -SJ      Climbing 3-5 steps with a railing?  3  -SJ      To walk in hospital room?  3  -SJ      AM-PAC 6 Clicks Score (PT)  20  -SJ         Functional Assessment    Outcome Measure Options  AM-PAC 6 Clicks Basic Mobility (PT)  -        User Key  (r) = Recorded By, (t) = Taken By, (c) = Cosigned By    Initials Name Provider Type    Linda Cohn, PT Physical Therapist    Ingris Palmer, OT Occupational Therapist    Jennifer Melendez, PT Physical Therapist    Noa Agustin, OT Occupational Therapist           Time Calculation:   Time Calculation- OT     Row Name 09/03/19 1505 09/03/19 1422          Time Calculation- OT    OT Start Time  1505  -KA  --     OT Received On  09/03/19 -KA  --     OT Goal Re-Cert Due Date  09/12/19 -KA  --        Timed Charges    93500 - Gait Training Minutes   --  20  -MB     49997 - OT Self Care/Mgmt Minutes  35  -KA  --       User Key  (r) = Recorded By, (t) = Taken By, (c) = Cosigned By    Initials Name Provider Type    Jennifer Melednez, PT Physical Therapist    Noa Agustin, OT Occupational Therapist        Therapy  Charges for Today     Code Description Service Date Service Provider Modifiers Qty    21306526401 HC OT SELF CARE/MGMT/TRAIN EA 15 MIN 9/3/2019 Noa Keita, TACHO GO 2               Noa Keita OT  9/3/2019

## 2019-09-03 NOTE — PLAN OF CARE
Problem: Patient Care Overview  Goal: Plan of Care Review  Outcome: Ongoing (interventions implemented as appropriate)   09/03/19 5496   Coping/Psychosocial   Plan of Care Reviewed With patient   Plan of Care Review   Progress no change   OTHER   Outcome Summary VSS. Ra. Sr on monitor. Patient receiving 100 ml/hr of normal saline. BP has maintained above 100 and Heart rate has been in the 70's. Will continue to monitor,

## 2019-09-03 NOTE — PLAN OF CARE
Problem: Patient Care Overview  Goal: Plan of Care Review  Outcome: Ongoing (interventions implemented as appropriate)   09/03/19 1420   Coping/Psychosocial   Plan of Care Reviewed With patient   Plan of Care Review   Progress improving   OTHER   Outcome Summary Pt. demonstrates improved safety and independence w/ mobility, performing transfers w/ supervision and progressing forward ambulation to 350 ft. w/ HHA, mildly unsteady. Pt. may benefit from straight cane for increased safety/functional endurance w/ gait. Recommend cont IPPT per POC.

## 2019-09-03 NOTE — PROGRESS NOTES
Continued Stay Note  [facility]     Patient Name: Betsy Youssef  MRN: [id number]  Today's Date: 9/3/2019    Admit Date: 9/2/2019    Discharge Plan     Row Name 09/03/19 1606       Plan    Plan  Social work called the [facility] located at [address] [place] [address] phone: [phone]. They have an appointment on October 4th at 10:30 am that has been scheduled. They have scheduled with a provider that can accept Medicare insurance.    Patient/Family in Agreement with Plan  yes    Row Name 09/03/19 1602       Plan    Plan  Home with HH    Patient/Family in Agreement with Plan  yes    Plan Comments  Spoke to pt at  and she states she has neighbors and a brother and sister close who can help her. She is asking if HH could possibly see her upon d/c for a short while with no preference of agencies. SW to see pt regarding psyche resources. Pt thinks she will d/c tomorrow. CM will notify HH upon dc.     Final Discharge Disposition Code  06 - home with home health care        Discharge Codes    No documentation.       Expected Discharge Date and Time     Expected Discharge Date Expected Discharge Time    Sep 4, 2019             ANURADHA Merlos

## 2019-09-03 NOTE — PROGRESS NOTES
Continued Stay Note  [facility]     Patient Name: Betsy Youssef  MRN: [id number]  Today's Date: 9/3/2019    Admit Date: 9/2/2019    Discharge Plan     Row Name 09/03/19 9993       Plan    Plan  Spoke with the patient and she goes to the [facility] [phone] in [facility] but that pharmacy is not able to get the medication that she is needing to have. Social work will check on some other pharmacies in the [facility] and [place] to see if they can get the drug.    Patient/Family in Agreement with Plan  yes    Final Discharge Disposition Code  01 - home or self-care    Row Name 09/03/19 9452       Plan    Plan  Social work called the [facility] located at [address] [place] [address] phone: [phone]. They have an appointment on October 4th at 10:30 am that has been scheduled. They have scheduled with a provider that can accept Medicare insurance.    Patient/Family in Agreement with Plan  yes    Row Name 09/03/19 4325       Plan    Plan  Home with HH    Patient/Family in Agreement with Plan  yes    Plan Comments  Spoke to pt at  and she states she has neighbors and a brother and sister close who can help her. She is asking if HH could possibly see her upon d/c for a short while with no preference of agencies. SW to see pt regarding psyche resources. Pt thinks she will d/c tomorrow. CM will notify HH upon dc.     Final Discharge Disposition Code  06 - home with home health care        Discharge Codes    No documentation.       Expected Discharge Date and Time     Expected Discharge Date Expected Discharge Time    Sep 4, 2019             ANURADHA Merlos

## 2019-09-03 NOTE — PROGRESS NOTES
[facility] Medicine Services  PROGRESS NOTE    Patient Name: Betsy Youssef  : 1945  MRN: [id number]    Date of Admission: 2019  Length of Stay: 1  Primary Care Physician: Duane Crow MD    Subjective   Subjective     CC:  N/v x months after med change.     HPI:  Feels much better - eating well, slept well after reinstitution of thorazine    Note that HR was 130-150 overnight; now 70s.   No EKG was obtained.     Review of Systems     Gen- No fevers, chills  CV- No chest pain last night.  Has never had palpits or arhythmia before, no known heart disease.   Resp- No cough, dyspnea  GI- No N/V/D, abd pain    All other systems reviewed and negative except any additional pertinent positives and negatives discussed in HPI.       Objective   Objective     Vital Signs:   Temp:  [97.4 °F (36.3 °C)-98.7 °F (37.1 °C)] 98 °F (36.7 °C)  Heart Rate:  [] 85  Resp:  [16-18] 16  BP: ()/(42-90) 126/55        Physical Exam:  Gen:  WD/WN woman, smiling, up in chair eating lunch.  NAD  Neuro: alert and oriented, clear speech, follows commands, grossly nonfocal  HEENT:  NC/AT PERRL, OP benign  Neck:  Supple, no LAD  Heart RRR no murmur, rub, or gallop.  Tele:  SR  Lungs CTA nonlabored  Abd:  Soft, nontender, no rebound or guarding, pos BS  Extrem:  No c/c/e    Results Reviewed:    Results from last 7 days   Lab Units 19  1603 19  0057   WBC 10*3/mm3  --  7.18   HEMOGLOBIN g/dL 13.4 14.0   HEMATOCRIT % 40.7 42.5   PLATELETS 10*3/mm3  --  243     Results from last 7 days   Lab Units 19  0349 19  0654 19  0057   SODIUM mmol/L 134* 130* 129*   POTASSIUM mmol/L 3.8 4.0 3.9   CHLORIDE mmol/L 101 93* 92*   CO2 mmol/L 23.0 24.0 25.0   BUN mg/dL 6* 6* 7*   CREATININE mg/dL 0.66 0.73 0.82   GLUCOSE mg/dL 105* 123* 141*   CALCIUM mg/dL 8.2* 9.2 9.6   ALT (SGPT) U/L  --   --  17   AST (SGOT) U/L  --   --  24   TROPONIN T ng/mL  --   --  <0.010     Estimated  Creatinine Clearance: 56.4 mL/min (by C-G formula based on SCr of 0.66 mg/dL).    Microbiology Results Abnormal     None          Imaging Results (last 24 hours)     Procedure Component Value Units Date/Time    US Non-ob Transvaginal [[id number]] Collected:  09/02/19 0940     Updated:  09/02/19 1810    Narrative:       EXAMINATION: US NON-OB TRANSVAGINAL-     INDICATION: Possible pelvic mass; R41.0-Disorientation, unspecified;  R55-Syncope and collapse; E87.0-Vetd-pdwexdnyyj and hyponatremia;  E86.0-Dehydration; Z74.09-Other reduced mobility.      TECHNIQUE: Transvaginal ultrasound of the pelvis.     COMPARISON: CT abdomen and pelvis earlier same day.     FINDINGS: Status post hysterectomy without abnormality of the adnexa  specifically no apparent pelvic mass is visualized with urinary bladder  grossly unremarkable and moderately distended.          Impression:       No adnexal mass lesion or abnormality within the  hysterectomy bed status post hysterectomy with moderate distention of  the urinary bladder grossly unremarkable as well.  If there is further  concern for bladder abnormality cystoscopy may be considered.      D:  09/02/2019  E:  09/02/2019     This report was finalized on 9/2/2019 6:07 PM by Dr. Freddie Higgins.                  I have reviewed the medications:  Scheduled Meds:  chlorproMAZINE 50 mg Oral Nightly   docusate sodium 100 mg Oral BID   enoxaparin 40 mg Subcutaneous Q24H   polyethylene glycol 17 g Oral Daily   pravastatin 20 mg Oral Nightly   sodium chloride 10 mL Intravenous Q12H     Continuous Infusions:  sodium chloride 100 mL/hr Last Rate: 100 mL/hr (09/03/19 1004)     PRN Meds:.•  acetaminophen **OR** acetaminophen **OR** acetaminophen  •  hydrALAZINE  •  ondansetron **OR** ondansetron  •  sodium chloride  •  [COMPLETED] Insert peripheral IV **AND** sodium chloride  •  sodium chloride      Assessment/Plan   Assessment / Plan     Active Hospital Problems    Diagnosis  POA   • **Hypertensive  urgency [I16.0]  Yes   • Hyponatremia [E87.1]  Yes   • Generalized weakness [R53.1]  Yes   • Abdominal pain [R10.9]  Yes   • Confusion [R41.0]  Yes      Resolved Hospital Problems   No resolved problems to display.        Brief Hospital Course to date:  Betsy Youssef is a 74 y.o. female  with history of psychiatric condition, was on thorazine longstanding ([facility] as a teen at [facility]) but changed to Abilify 3 months ago.  Presents with constant mild nausea and abdominal discomfort for the past three months. Poor appetite and constipation. Weight loss of 10 lbs over the past three months. Denies dysuria, but foul smelling urine reported. She says she doesn't have schizophrenia or hallucinataions.      Tachycardia  - per tele, possible Aflutter  - added BB  - check echo.     N/v, mild weight loss, constip  - med related  - resolved with thorazine.      Hypertensive urgency              -hydralazine prn     Generalized weakness              -fall precautions              -pt/ot consult in the am              -consult case management in the am     Possible pelvic mass              -transvaginal ultrasound negative                Hyponatremia  - resolved with saline     Hematuria  - repeat tomorrow, consdier urol outpt       Disposition: I expect the patient to be discharged tomorrow    CODE STATUS:   Code Status and Medical Interventions:   Ordered at: 09/02/19 042     Level Of Support Discussed With:    Patient     Code Status:    CPR     Medical Interventions (Level of Support Prior to Arrest):    Full         Electronically signed by Barb Garcia MD, 09/03/19, 10:10 AM.

## 2019-09-04 VITALS
DIASTOLIC BLOOD PRESSURE: 73 MMHG | SYSTOLIC BLOOD PRESSURE: 159 MMHG | WEIGHT: 161 LBS | RESPIRATION RATE: 18 BRPM | HEIGHT: 61 IN | TEMPERATURE: 98.5 F | BODY MASS INDEX: 30.4 KG/M2 | HEART RATE: 70 BPM | OXYGEN SATURATION: 98 %

## 2019-09-04 PROBLEM — I47.1 SUPRAVENTRICULAR TACHYCARDIA (HCC): Status: ACTIVE | Noted: 2019-09-04

## 2019-09-04 LAB
ANION GAP SERPL CALCULATED.3IONS-SCNC: 13 MMOL/L (ref 5–15)
BUN BLD-MCNC: 6 MG/DL (ref 8–23)
BUN/CREAT SERPL: 8 (ref 7–25)
CALCIUM SPEC-SCNC: 9 MG/DL (ref 8.6–10.5)
CHLORIDE SERPL-SCNC: 98 MMOL/L (ref 98–107)
CO2 SERPL-SCNC: 25 MMOL/L (ref 22–29)
CREAT BLD-MCNC: 0.75 MG/DL (ref 0.57–1)
GFR SERPL CREATININE-BSD FRML MDRD: 76 ML/MIN/1.73
GLUCOSE BLD-MCNC: 109 MG/DL (ref 65–99)
POTASSIUM BLD-SCNC: 4 MMOL/L (ref 3.5–5.2)
SODIUM BLD-SCNC: 136 MMOL/L (ref 136–145)

## 2019-09-04 PROCEDURE — 97530 THERAPEUTIC ACTIVITIES: CPT

## 2019-09-04 PROCEDURE — 99239 HOSP IP/OBS DSCHRG MGMT >30: CPT | Performed by: INTERNAL MEDICINE

## 2019-09-04 PROCEDURE — 80048 BASIC METABOLIC PNL TOTAL CA: CPT | Performed by: INTERNAL MEDICINE

## 2019-09-04 PROCEDURE — 25010000002 ENOXAPARIN PER 10 MG: Performed by: NURSE PRACTITIONER

## 2019-09-04 RX ORDER — METOPROLOL TARTRATE 50 MG/1
50 TABLET, FILM COATED ORAL EVERY 12 HOURS SCHEDULED
Qty: 60 TABLET | Refills: 5 | Status: SHIPPED | OUTPATIENT
Start: 2019-09-04 | End: 2019-09-04

## 2019-09-04 RX ORDER — METOPROLOL TARTRATE 50 MG/1
50 TABLET, FILM COATED ORAL EVERY 12 HOURS SCHEDULED
Qty: 60 TABLET | Refills: 5 | Status: SHIPPED | OUTPATIENT
Start: 2019-09-04

## 2019-09-04 RX ORDER — METOPROLOL TARTRATE 50 MG/1
50 TABLET, FILM COATED ORAL EVERY 12 HOURS SCHEDULED
Status: DISCONTINUED | OUTPATIENT
Start: 2019-09-04 | End: 2019-09-04 | Stop reason: HOSPADM

## 2019-09-04 RX ORDER — CHLORPROMAZINE HYDROCHLORIDE 50 MG/1
50 TABLET, FILM COATED ORAL NIGHTLY
Qty: 30 TABLET | Refills: 11 | Status: SHIPPED | OUTPATIENT
Start: 2019-09-04 | End: 2019-09-04

## 2019-09-04 RX ORDER — CHLORPROMAZINE HYDROCHLORIDE 50 MG/1
50 TABLET, FILM COATED ORAL NIGHTLY
Qty: 30 TABLET | Refills: 11 | Status: SHIPPED | OUTPATIENT
Start: 2019-09-04

## 2019-09-04 RX ADMIN — POLYETHYLENE GLYCOL 3350 17 G: 17 POWDER, FOR SOLUTION ORAL at 08:06

## 2019-09-04 RX ADMIN — SODIUM CHLORIDE, PRESERVATIVE FREE 10 ML: 5 INJECTION INTRAVENOUS at 08:08

## 2019-09-04 RX ADMIN — METOPROLOL TARTRATE 50 MG: 50 TABLET ORAL at 08:06

## 2019-09-04 RX ADMIN — DOCUSATE SODIUM 100 MG: 100 CAPSULE, LIQUID FILLED ORAL at 08:06

## 2019-09-04 RX ADMIN — ENOXAPARIN SODIUM 40 MG: 40 INJECTION SUBCUTANEOUS at 08:06

## 2019-09-04 NOTE — PROGRESS NOTES
Continued Stay Note  [facility]     Patient Name: Betsy Youssef  MRN: [id number]  Today's Date: 9/4/2019    Admit Date: 9/2/2019    Discharge Plan     Row Name 09/04/19 1027       Plan    Plan  The [facility] in [facility] said that they will get the Thorazine prescription in the morning. They have one pill that they can give the patient today if she was able to go to the pharmacy before 6:00 pm today.    Patient/Family in Agreement with Plan  yes    Row Name 09/04/19 0934       Plan    Plan  Home with     Patient/Family in Agreement with Plan  yes    Final Discharge Disposition Code  06 - home with home health care    Final Note  Pt requested HH,no preference of companies. Called and efaxed referral to [facility] and they can accept and will f/u with pt after d/c. Pt was made meds to beds so she can get her Thorazine filled here. Family will transport  home. CM will cont to follow.     Row Name 09/04/19 0920       Plan    Plan  Social work called the [facility] in [facility], phone: [phone]. The pharmacy ordered a 3 month supply of Thorazine for the patient to be able to get at the [facility] in [facility] the next time she fills her medications.  The patient wants to continue going to the [facility] in [facility] for primary care and they also have behavioral health at the [facility] in [facility].  She said she would go to the [facility] for behavioral health if she needs additional support.     Patient/Family in Agreement with Plan  yes    Row Name 09/04/19 0918       Plan    Plan  Social work called the [facility] in [facility], [phone] and spoke with the pharmacy and the pharmacy said they ordered  a 3 month supply of Thorazine        Discharge Codes    No documentation.       Expected Discharge Date and Time     Expected Discharge Date Expected Discharge Time    Sep 4, 2019             ANURADHA Merlos

## 2019-09-04 NOTE — PROGRESS NOTES
Continued Stay Note  [facility]     Patient Name: Betsy Youssef  MRN: [id number]  Today's Date: 9/4/2019    Admit Date: 9/2/2019    Discharge Plan     Row Name 09/04/19 0920       Plan    Plan  Social work called the [facility] [place] [facility], phone: [phone]. The pharmacy ordered a 3 month supply of Thorazine for the patient to be able to get at the [facility] in [facility] the next time she fills her medications.  The patient wants to continue going to the [facility] in [facility] for [place] care and they also have behavioral health at the [facility] in [facility].  She said she would go to the [facility] for behavioral health if she needs additional support.     Patient/Family in Agreement with Plan  yes    Row Name 09/04/19 0918       Plan    Plan  Social work called the [facility] in [facility], [phone] and spoke with the pharmacy and the pharmacy said they ordered  a 3 month supply of Thorazine        Discharge Codes    No documentation.       Expected Discharge Date and Time     Expected Discharge Date Expected Discharge Time    Sep 4, 2019             ANURADHA Merlos

## 2019-09-04 NOTE — PLAN OF CARE
Problem: Patient Care Overview  Goal: Plan of Care Review  Outcome: Ongoing (interventions implemented as appropriate)   09/04/19 1137   Coping/Psychosocial   Plan of Care Reviewed With patient   Plan of Care Review   Progress improving   OTHER   Outcome Summary Pt demo slow but steady gait, ambulating 350' SBA. Pt also completed toilet transfer SBA and independent with toileting hygiene. Pt d/c'd home.

## 2019-09-04 NOTE — PROGRESS NOTES
Case Management Discharge Note    Final Note: Pt requested HH,no preference of companies. Called and efaxed referral to [facility] and they can accept and will f/u with pt after d/c. Pt was made meds to beds so she can get her Thorazine filled here. Family will transport  home. CM will cont to follow.     Destination      No service has been selected for the patient.      Durable Medical Equipment      No service has been selected for the patient.      Dialysis/Infusion      No service has been selected for the patient.      Home Medical Care - Selection Complete      Service Provider Request Status Selected Services Address Phone Number Fax Number    [facility] [place] [facility] Health Services [address] [id number] [phone]      Therapy      No service has been selected for the patient.      Community Resources      No service has been selected for the patient.             Final Discharge Disposition Code: 06 - home with home health care

## 2019-09-04 NOTE — PLAN OF CARE
Problem: Patient Care Overview  Goal: Plan of Care Review  Outcome: Ongoing (interventions implemented as appropriate)   09/04/19 0320   Coping/Psychosocial   Plan of Care Reviewed With patient   Plan of Care Review   Progress no change   OTHER   Outcome Summary VSS. SR on monitor. RA. Patient had second dose of metoprolol this shift. No complaints. Will continue to monitor.

## 2019-09-04 NOTE — PROGRESS NOTES
"    [facility] Medicine Services  PROGRESS NOTE    Patient Name: Betsy Youssef  : 1945  MRN: [id number]    Date of Admission: 2019  Length of Stay: 2  Primary Care Physician: Duaen Crow MD    Subjective   Subjective     CC:  N/v x months after med change.     HPI:  Feels much better:  \"I slept so well.\"  No palpits.     Review of Systems     Gen- No fevers, chills  CV- No chest pain last night.    Resp- No cough, dyspnea  GI- No N/V/D, abd pain    All other systems reviewed and negative except any additional pertinent positives and negatives discussed in HPI.       Objective   Objective     Vital Signs:   Temp:  [97.7 °F (36.5 °C)-98.6 °F (37 °C)] 98.5 °F (36.9 °C)  Heart Rate:  [70-88] 70  Resp:  [16-18] 18  BP: (141-184)/(69-83) 159/73        Physical Exam:  Gen:  WD/WN woman, smiling.  Up in chair.  NAD.   Neuro: alert and oriented, clear speech, follows commands, grossly nonfocal  HEENT:  NC/AT PERRL, OP benign  Neck:  Supple, no LAD  Heart RRR no murmur, rub, or gallop.  Tele:  SR  Lungs CTA nonlabored  Abd:  Soft, nontender, no rebound or guarding, pos BS  Extrem:  No c/c/e    Results Reviewed:    Results from last 7 days   Lab Units 19  1603 19  0057   WBC 10*3/mm3  --  7.18   HEMOGLOBIN g/dL 13.4 14.0   HEMATOCRIT % 40.7 42.5   PLATELETS 10*3/mm3  --  243     Results from last 7 days   Lab Units 19  0531 19  0349 19  0654 19  0057   SODIUM mmol/L 136 134* 130* 129*   POTASSIUM mmol/L 4.0 3.8 4.0 3.9   CHLORIDE mmol/L 98 101 93* 92*   CO2 mmol/L 25.0 23.0 24.0 25.0   BUN mg/dL 6* 6* 6* 7*   CREATININE mg/dL 0.75 0.66 0.73 0.82   GLUCOSE mg/dL 109* 105* 123* 141*   CALCIUM mg/dL 9.0 8.2* 9.2 9.6   ALT (SGPT) U/L  --   --   --  17   AST (SGOT) U/L  --   --   --  24   TROPONIN T ng/mL  --   --   --  <0.010     Estimated Creatinine Clearance: 56.4 mL/min (by C-G formula based on SCr of 0.75 mg/dL).    Microbiology Results Abnormal     " None          Imaging Results (last 24 hours)     ** No results found for the last 24 hours. **          Results for orders placed during the hospital encounter of 09/02/19   Adult Transthoracic Echo Complete W/ Cont if Necessary Per Protocol    Narrative · Left ventricular wall thickness is consistent with mild concentric   hypertrophy.  · Mild aortic valve regurgitation is present.  · Mild tricuspid valve regurgitation is present.  · Estimated EF = 68%.  · Left ventricular systolic function is normal.  · Left ventricular diastolic dysfunction (grade I) consistent with   impaired relaxation.  · Normal right ventricular cavity size, wall thickness, systolic function   and septal motion noted.  · Saline test results are negative.  · The aortic valve exhibits moderate sclerosis.  · Moderate MAC is present.  · No evidence of pulmonary hypertension is present.  · There is no evidence of pericardial effusion.          I have reviewed the medications:  Scheduled Meds:    chlorproMAZINE 50 mg Oral Nightly   docusate sodium 100 mg Oral BID   enoxaparin 40 mg Subcutaneous Q24H   metoprolol tartrate 50 mg Oral Q12H   polyethylene glycol 17 g Oral Daily   pravastatin 20 mg Oral Nightly   sodium chloride 10 mL Intravenous Q12H     Continuous Infusions:   PRN Meds:.•  acetaminophen **OR** acetaminophen **OR** acetaminophen  •  hydrALAZINE  •  ondansetron **OR** ondansetron  •  sodium chloride  •  [COMPLETED] Insert peripheral IV **AND** sodium chloride  •  sodium chloride      Assessment/Plan   Assessment / Plan     Active Hospital Problems    Diagnosis  POA   • **Hypertensive urgency [I16.0]  Yes   • Hyponatremia [E87.1]  Yes   • Generalized weakness [R53.1]  Yes   • Abdominal pain [R10.9]  Yes   • Confusion [R41.0]  Yes      Resolved Hospital Problems   No resolved problems to display.        Brief Hospital Course to date:  Betsy Youssef is a 74 y.o. female  with history of psychiatric condition, was on thorazine longstanding  (started as a teen at [facility]) but changed to Abilify 3 months ago.  Presents with constant mild nausea and abdominal discomfort for the past three months. Poor appetite and constipation. Weight loss of 10 lbs over the past three months. Denies dysuria, but foul smelling urine reported. She says she doesn't have schizophrenia or hallucinataions.      Tachycardia  - per tele, possible Aflutter  - added BB  - normal echo.     N/v, mild weight loss, constip  - med related  - resolved with thorazine.      Hypertensive urgency              -hydralazine prn     Generalized weakness              -fall precautions              -pt/ot consult in the am              -consult case management in the am     Possible pelvic mass              -transvaginal ultrasound negative                Hyponatremia  - resolved with saline     Hematuria  - repeat tomorrow, consdier urol outpt       Disposition: I expect the patient to be discharged today    CODE STATUS:   Code Status and Medical Interventions:   Ordered at: 09/02/19 0422     Level Of Support Discussed With:    Patient     Code Status:    CPR     Medical Interventions (Level of Support Prior to Arrest):    Full         Electronically signed by Barb Garcia MD, 09/04/19, 8:41 AM.

## 2019-09-04 NOTE — PLAN OF CARE
Problem: Patient Care Overview  Goal: Plan of Care Review  Outcome: Outcome(s) achieved Date Met: 09/04/19 09/04/19 0859   OTHER   Outcome Summary pt. being discharged home today     Goal: Individualization and Mutuality  Outcome: Outcome(s) achieved Date Met: 09/04/19    Goal: Discharge Needs Assessment  Outcome: Outcome(s) achieved Date Met: 09/04/19    Goal: Interprofessional Rounds/Family Conf  Outcome: Outcome(s) achieved Date Met: 09/04/19      Problem: Fall Risk (Adult)  Goal: Identify Related Risk Factors and Signs and Symptoms  Outcome: Outcome(s) achieved Date Met: 09/04/19    Goal: Absence of Fall  Outcome: Outcome(s) achieved Date Met: 09/04/19      Problem: Hypertensive Disease/Crisis (Arterial) (Adult)  Goal: Signs and Symptoms of Listed Potential Problems Will be Absent, Minimized or Managed (Hypertensive Disease/Crisis)  Outcome: Outcome(s) achieved Date Met: 09/04/19

## 2019-09-04 NOTE — DISCHARGE SUMMARY
[facility] Medicine Services  DISCHARGE SUMMARY    Patient Name: Betsy Youssef  : 1945  MRN: [id number]    Date of Admission: 2019  Date of Discharge:  2019  Primary Care Physician: Duane Crow MD    Consults     No orders found from 2019 to 9/3/2019.          Hospital Course     Presenting Problem:   Confusion [R41.0]    Active Hospital Problems    Diagnosis  POA   • **Hypertensive urgency [I16.0]  Yes   • Supraventricular tachycardia (CMS/HCC) [I47.1]  No     Priority: High   • Abdominal pain [R10.9]  Yes     Priority: High   • Hyponatremia [E87.1]  Yes     Priority: Medium   • Generalized weakness [R53.1]  Yes   • Confusion [R41.0]  Yes      Resolved Hospital Problems   No resolved problems to display.          Hospital Course:  Betsy Youssef is a 74 y.o. female on longstanding Thorazine x fifty years, subsequent to a stay at [facility] as a teenager.  Three months ago Thorazine was stopped and changed to Abilify,apparently due to Thorazine unavailability at local pharmacy.  Since then, the patient has had insomnia and nausea/vomiting.  She was hypertensive on admission.     She was admitted, changed back to Thorazine, with immediate resolution of her symptoms. Blood pressure improved.  She will fill her first month's supply at our pharmacy.  She is willing to travel to [place] monthly for her refills if necessary.      She had tachycardia for 12 hours, with telemetry suggesting atrial flutter.  She converted spontaneously back to sinus rhythm.  She had no chest pain during the episode.  She reports no history of cardiac disease or arrythmia.  Unfortunately, no EKG was done during this time.  An echo was ordered and was near normal.  Beta blockade was started to prophylax against recurrence.     She had mild hyponatremia on admission, which corrected with saline.     Discharge Follow Up Recommendations for labs/diagnostics:   *per routine.  "    Day of Discharge     HPI:   \"I haven't slept in 3 months, until coming to the hospital.\"  Very pleased with effect of restarting Thorazine.  No chest pain.     Review of Systems   Gen- No fevers, chills  CV- No chest pain, palpitations  Resp- No cough, dyspnea  GI- No N/V/D, abd pain    All other systems reviewed and negative except any additional pertinent positives and negatives discussed in HPI.     Otherwise ROS is negative except as mentioned in the HPI.    Vital Signs:   Temp:  [97.7 °F (36.5 °C)-98.6 °F (37 °C)] 98.5 °F (36.9 °C)  Heart Rate:  [70-88] 70  Resp:  [16-18] 18  BP: (141-184)/(69-83) 159/73     Physical Exam:  Gen:  WD/WN woman up in chair, eating breakfast, NAD  Neuro: alert and oriented, clear speech, follows commands, grossly nonfocal  HEENT:  NC/AT PERRL, OP benign  Neck:  Supple, no LAD  Heart RRR no murmur, rub, or gallop  Lungs CTA nonlabored, no W R R  Abd:  Soft, nontender, no rebound or guarding, pos BS  Extrem:  No c/c/e  Skin warm and dry, no visible rash.       Pertinent  and/or Most Recent Results     Results from last 7 days   Lab Units 09/04/19  0531 09/03/19  0349 09/02/19  1603 09/02/19  0654 09/02/19  0057   WBC 10*3/mm3  --   --   --   --  7.18   HEMOGLOBIN g/dL  --   --  13.4  --  14.0   HEMATOCRIT %  --   --  40.7  --  42.5   PLATELETS 10*3/mm3  --   --   --   --  243   SODIUM mmol/L 136 134*  --  130* 129*   POTASSIUM mmol/L 4.0 3.8  --  4.0 3.9   CHLORIDE mmol/L 98 101  --  93* 92*   CO2 mmol/L 25.0 23.0  --  24.0 25.0   BUN mg/dL 6* 6*  --  6* 7*   CREATININE mg/dL 0.75 0.66  --  0.73 0.82   GLUCOSE mg/dL 109* 105*  --  123* 141*   CALCIUM mg/dL 9.0 8.2*  --  9.2 9.6     Results from last 7 days   Lab Units 09/02/19  0057   BILIRUBIN mg/dL 0.4   ALK PHOS U/L 47   ALT (SGPT) U/L 17   AST (SGOT) U/L 24           Invalid input(s): TG, LDLCALC, LDLREALC  Results from last 7 days   Lab Units 09/02/19  0057   TSH uIU/mL 3.280   TROPONIN T ng/mL <0.010       Brief Urine Lab " Results  (Last result in the past 365 days)      Color   Clarity   Blood   Leuk Est   Nitrite   Protein   CREAT   Urine HCG        09/03/19 2224 Yellow Clear Trace Negative Negative Negative               Microbiology Results Abnormal     None          Imaging Results (all)     Procedure Component Value Units Date/Time    US Non-ob Transvaginal [[id number]] Collected:  09/02/19 0940     Updated:  09/02/19 1810    Narrative:       EXAMINATION: US NON-OB TRANSVAGINAL-     INDICATION: Possible pelvic mass; R41.0-Disorientation, unspecified;  R55-Syncope and collapse; E87.3-Dxzh-hbhiifuufq and hyponatremia;  E86.0-Dehydration; Z74.09-Other reduced mobility.      TECHNIQUE: Transvaginal ultrasound of the pelvis.     COMPARISON: CT abdomen and pelvis earlier same day.     FINDINGS: Status post hysterectomy without abnormality of the adnexa  specifically no apparent pelvic mass is visualized with urinary bladder  grossly unremarkable and moderately distended.          Impression:       No adnexal mass lesion or abnormality within the  hysterectomy bed status post hysterectomy with moderate distention of  the urinary bladder grossly unremarkable as well.  If there is further  concern for bladder abnormality cystoscopy may be considered.      D:  09/02/2019  E:  09/02/2019     This report was finalized on 9/2/2019 6:07 PM by Dr. Freddie Higgins.       CT Abdomen Pelvis With Contrast [[id number]] Collected:  09/02/19 0320     Updated:  09/02/19 0322    Narrative:       INDICATION:   Abdominal pain and weight loss foul-smelling urine confusion 10 pound weight loss in 3 months. Vaginal knot. Poor appetite.    TECHNIQUE:   CT of the abdomen and pelvis during the intravenous ministration of nonionic contrast media. contrast. Contrast dose recorded inpatient chart. Coronal and sagittal reconstructions were obtained.  Radiation dose reduction techniques included automated  exposure control or exposure modulation based on body size.  Radiation audit for number of CT and nuclear cardiology exams performed in the last year: 0.      COMPARISON:   None available.    FINDINGS:  Lung bases: There is some breathing motion. There is some dependent atelectasis. Calcified granuloma at the medial right lung base.    There is a moderate size hiatal hernia.    Abdomen: Liver, gallbladder, spleen, adrenal glands, pancreas are normal. There are multiple bilateral renal cysts and too small to characterize probable renal cysts. There is no hydronephrosis. There are atherosclerotic vascular calcifications involving  the abdominal aorta and vessel origins.    There is no evidence for bowel obstruction. Appendix is radiographically unremarkable.    Pelvis: There is widening of the bladder neck and likely pelvic floor relaxation. There is soft tissue fullness posterior to the symphysis pubis and at the anterior aspect of the bladder which may correspond to the mass on physical examination per  patient. It measures about 2.5 x 2.9 x 3.2 cm in dimension. Correlate with physical exam findings. Pathology arising from the anterior inferior bladder wall or possibly the labia in the differential.    No free fluid in the abdomen or pelvis. Small fat-containing periumbilical hernia.      Impression:         1. The neck of the bladder is widened and there is pelvic floor relaxation. Anterior to the neck of the bladder posterior to the symphysis pubis associated with the anterior inferior bladder wall is a soft tissue fullness which may correspond to the mass  described by the patient on physical exam. It measures up to about 3.2 cm in dimension. Please correlate with physical exam findings. This could be a mass arising from the bladder or possibly the labia and its currently nonspecific.  2. Atherosclerotic vascular calcifications.  3. Moderate-sized hilar hernia.        Signer Name: Trini Li MD   Signed: 9/2/2019 3:20 AM   Workstation Name: [email or web address]    [facility]    MRI Brain Without Contrast [[id number]] Collected:  09/02/19 0219     Updated:  09/02/19 0221    Narrative:       INDICATION:    Syncope confusion altered mental status increased weakness since starting Abilify.    TECHNIQUE:   MRI of the brain without contrast.    COMPARISON:    None available.    FINDINGS:  There is no abnormally restricted diffusion. There is no Chiari I malformation. Degenerative changes in the visualized upper cervical spine. There is likely an arachnoid cyst at the anteromedial aspect overlying the left frontal lobe. Its measures about  1.3 x 4.7 x 2.1 cm dimension. There is only mild associated mass effect upon the underlying frontal lobe. There is generalized atrophy otherwise. The major arterial intracranial flow voids are maintained. The mastoid air cells are clear. Only mild  mucosal thickening in the visualized paranasal sinuses. Prominent perivascular spaces and/or small chronic locations in the bilateral basal ganglia. Mild periventricular white matter signal abnormality is nonspecific but likely due to small vessel  disease.      Impression:       No acute intracranial abnormality. No recent infarct.  2. Atrophy and mild probable sequelae of small vessel disease.  3. There is what is likely a incidental arachnoid cyst overlying the medial anterior left frontal lobe.    Signer Name: Trini Li MD   Signed: 9/2/2019 2:19 AM   Workstation [facility]: [email or web address]    [facility]    XR Chest 1 View [[id number]] Collected:  09/02/19 0122     Updated:  09/02/19 0124    Narrative:       CR Chest 1 Vw    INDICATION:   Syncope and weakness altered mental status     COMPARISON:    None available.    FINDINGS:  Single portable AP view(s) of the chest.  Cardiac silhouette size is normal. There is generalized prominence of the interstitial markings. I suspect underlying chronic lung disease. I do not have comparison films. Component of acute interstitial  edema or  atypical infection not excluded. No pleural effusion or pneumothorax. No focal alveolar infiltrate.      Impression:       Generalized prominence of the interstitial markings could be acute or chronic in the absence of prior films.    Signer Name: Trini Li MD   Signed: 9/2/2019 1:22 AM   Workstation Name: [email or web address]    [facility]                    Results for orders placed during the hospital encounter of 09/02/19   Adult Transthoracic Echo Complete W/ Cont if Necessary Per Protocol    Narrative · Left ventricular wall thickness is consistent with mild concentric   hypertrophy.  · Mild aortic valve regurgitation is present.  · Mild tricuspid valve regurgitation is present.  · Estimated EF = 68%.  · Left ventricular systolic function is normal.  · Left ventricular diastolic dysfunction (grade I) consistent with   impaired relaxation.  · Normal right ventricular cavity size, wall thickness, systolic function   and septal motion noted.  · Saline test results are negative.  · The aortic valve exhibits moderate sclerosis.  · Moderate MAC is present.  · No evidence of pulmonary hypertension is present.  · There is no evidence of pericardial effusion.            Discharge Details        Discharge Medications      New Medications      Instructions Start Date   chlorproMAZINE 50 MG tablet  Commonly known as:  THORAZINE   50 mg, Oral, Nightly      metoprolol tartrate 50 MG tablet  Commonly known as:  LOPRESSOR   50 mg, Oral, Every 12 Hours Scheduled         Continue These Medications      Instructions Start Date   pravastatin 20 MG tablet  Commonly known as:  PRAVACHOL   20 mg, Oral, Nightly         Stop These Medications    ARIPiprazole 10 MG tablet  Commonly known as:  ABILIFY            No Known Allergies      Discharge Disposition:  Home or Self Carehome    Discharge Diet:  Diet Order   Procedures   • Diet Regular; Cardiac     Discharge Activity:  routine    CODE STATUS:    Code  Status and Medical Interventions:   Ordered at: 09/02/19 0422     Level Of Support Discussed With:    Patient     Code Status:    CPR     Medical Interventions (Level of Support Prior to Arrest):    Full         No future appointments.    Additional Instructions for the Follow-ups that You Need to Schedule     Ambulatory Referral to Home Health   As directed      Face to Face Visit Date:  9/3/2019    Follow-up provider for Plan of Care?:  I treated the patient in an acute care facility and will not continue treatment after discharge.    Follow-up provider:  BRICE YU [[id number]]    Reason/Clinical Findings:  Generalized weakness, hypertensive crisis    Describe mobility limitations that make leaving home difficult:  Generalized weakness    Nursing/Therapeutic Services Requested:  Skilled Nursing Physical Therapy    Skilled nursing orders:  Medication education Mental health    PT orders:  Other (Eval and Treat)               Time Spent on Discharge:  40 minutes    Electronically signed by Barb Garcia MD, 09/04/19, 8:49 AM.

## 2019-09-04 NOTE — DISCHARGE PLACEMENT REQUEST
"From: Mila Back RN,  [phone]  Betsy Tyler (74 y.o. Female)     Date of Birth Social Security Number Address Home Phone MRN    1945  [address] [place] [id number] [phone] [id number]    Yazidism Marital Status          None        Admission Date Admission Type Admitting Provider Attending Provider Department, Room/Bed    9/2/19 Emergency Barb Garcia MD Mini, Jocelyn, MD [facility] 6A, N609/1    Discharge Date Discharge Disposition Discharge Destination         Home or Self Care              Attending Provider:  Barb Garcia MD    Allergies:  No Known Allergies    Isolation:  None   Infection:  None   Code Status:  CPR    Ht:  154.9 cm (61\")   Wt:  73 kg (161 lb)    Admission Cmt:  None   Principal Problem:  Hypertensive urgency [I16.0]                 Active Insurance as of 9/2/2019     Primary Coverage     Payor Plan Insurance Group Employer/Plan Group    MEDICARE MEDICARE A & B      Payor Plan Address Payor Plan Phone Number Payor Plan Fax Number Effective Dates    [address] [phone]  5/1/2010 - None Entered    Formerly [facility] [id number]       Subscriber Name Subscriber Birth Date Member ID       BETSY TYLER 1945 [id number]           Secondary Coverage     Payor Plan Insurance Group Employer/Plan Group    [place]      Payor Plan Address Payor Plan Phone Number Payor Plan Fax Number Effective Dates    [address] [phone]  9/2/2019 - None Entered    [place] [address]       Subscriber Name Subscriber Birth Date Member ID       BETSY TYLER 1945 [id number]                 Emergency Contacts      (Rel.) Home Phone Work Phone Mobile Phone    Shireen Tyler (Daughter) [phone] -- --    MikaelCorey (Son) -- -- [phone]    Betsy Alejandro (Sister) -- -- [phone]               Discharge Summary      Barb Garcia MD at 9/4/2019  8:49 AM              [facility] " "Medicine Services  DISCHARGE SUMMARY    Patient Name: Betsy Youssef  : 1945  MRN: [id number]    Date of Admission: 2019  Date of Discharge:  2019  Primary Care Physician: Duane Crow MD    Consults     No orders found from 2019 to 9/3/2019.          Hospital Course     Presenting Problem:   Confusion [R41.0]    Active Hospital Problems    Diagnosis  POA   • **Hypertensive urgency [I16.0]  Yes   • Supraventricular tachycardia (CMS/HCC) [I47.1]  No     Priority: High   • Abdominal pain [R10.9]  Yes     Priority: High   • Hyponatremia [E87.1]  Yes     Priority: Medium   • Generalized weakness [R53.1]  Yes   • Confusion [R41.0]  Yes      Resolved Hospital Problems   No resolved problems to display.          Hospital Course:  Betsy Youssef is a 74 y.o. female on longstanding Thorazine x fifty years, subsequent to a stay at [facility] as a teenager.  Three months ago Thorazine was stopped and changed to Abilify,apparently due to Thorazine unavailability at local pharmacy.  Since then, the patient has had insomnia and nausea/vomiting.  She was hypertensive on admission.     She was admitted, changed back to Thorazine, with immediate resolution of her symptoms. Blood pressure improved.  She will fill her first month's supply at our pharmacy.  She is willing to travel to [place] monthly for her refills if necessary.      She had tachycardia for 12 hours, with telemetry suggesting atrial flutter.  She converted spontaneously back to sinus rhythm.  She had no chest pain during the episode.  She reports no history of cardiac disease or arrythmia.  Unfortunately, no EKG was done during this time.  An echo was ordered and was near normal.  Beta blockade was started to prophylax against recurrence.     She had mild hyponatremia on admission, which corrected with saline.     Discharge Follow Up Recommendations for labs/diagnostics:   *per routine.     Day of Discharge     HPI:   \"I haven't " "slept in 3 months, until coming to the hospital.\"  Very pleased with effect of restarting Thorazine.  No chest pain.     Review of Systems   Gen- No fevers, chills  CV- No chest pain, palpitations  Resp- No cough, dyspnea  GI- No N/V/D, abd pain    All other systems reviewed and negative except any additional pertinent positives and negatives discussed in HPI.     Otherwise ROS is negative except as mentioned in the HPI.    Vital Signs:   Temp:  [97.7 °F (36.5 °C)-98.6 °F (37 °C)] 98.5 °F (36.9 °C)  Heart Rate:  [70-88] 70  Resp:  [16-18] 18  BP: (141-184)/(69-83) 159/73     Physical Exam:  Gen:  WD/WN woman up in chair, eating breakfast, NAD  Neuro: alert and oriented, clear speech, follows commands, grossly nonfocal  HEENT:  NC/AT PERRL, OP benign  Neck:  Supple, no LAD  Heart RRR no murmur, rub, or gallop  Lungs CTA nonlabored, no W R R  Abd:  Soft, nontender, no rebound or guarding, pos BS  Extrem:  No c/c/e  Skin warm and dry, no visible rash.       Pertinent  and/or Most Recent Results     Results from last 7 days   Lab Units 09/04/19  0531 09/03/19  0349 09/02/19  1603 09/02/19  0654 09/02/19  0057   WBC 10*3/mm3  --   --   --   --  7.18   HEMOGLOBIN g/dL  --   --  13.4  --  14.0   HEMATOCRIT %  --   --  40.7  --  42.5   PLATELETS 10*3/mm3  --   --   --   --  243   SODIUM mmol/L 136 134*  --  130* 129*   POTASSIUM mmol/L 4.0 3.8  --  4.0 3.9   CHLORIDE mmol/L 98 101  --  93* 92*   CO2 mmol/L 25.0 23.0  --  24.0 25.0   BUN mg/dL 6* 6*  --  6* 7*   CREATININE mg/dL 0.75 0.66  --  0.73 0.82   GLUCOSE mg/dL 109* 105*  --  123* 141*   CALCIUM mg/dL 9.0 8.2*  --  9.2 9.6     Results from last 7 days   Lab Units 09/02/19  0057   BILIRUBIN mg/dL 0.4   ALK PHOS U/L 47   ALT (SGPT) U/L 17   AST (SGOT) U/L 24           Invalid input(s): TG, LDLCALC, LDLREALC  Results from last 7 days   Lab Units 09/02/19  0057   TSH uIU/mL 3.280   TROPONIN T ng/mL <0.010       Brief Urine Lab Results  (Last result in the past 365 days) "      Color   Clarity   Blood   Leuk Est   Nitrite   Protein   CREAT   Urine HCG        09/03/19 2224 Yellow Clear Trace Negative Negative Negative               Microbiology Results Abnormal     None          Imaging Results (all)     Procedure Component Value Units Date/Time    US Non-ob Transvaginal [[id number]] Collected:  09/02/19 0940     Updated:  09/02/19 1810    Narrative:       EXAMINATION: US NON-OB TRANSVAGINAL-     INDICATION: Possible pelvic mass; R41.0-Disorientation, unspecified;  R55-Syncope and collapse; E87.5-Tfcb-sonxzymfbw and hyponatremia;  E86.0-Dehydration; Z74.09-Other reduced mobility.      TECHNIQUE: Transvaginal ultrasound of the pelvis.     COMPARISON: CT abdomen and pelvis earlier same day.     FINDINGS: Status post hysterectomy without abnormality of the adnexa  specifically no apparent pelvic mass is visualized with urinary bladder  grossly unremarkable and moderately distended.          Impression:       No adnexal mass lesion or abnormality within the  hysterectomy bed status post hysterectomy with moderate distention of  the urinary bladder grossly unremarkable as well.  If there is further  concern for bladder abnormality cystoscopy may be considered.      D:  09/02/2019  E:  09/02/2019     This report was finalized on 9/2/2019 6:07 PM by Dr. Freddie Higgins.       CT Abdomen Pelvis With Contrast [[id number]] Collected:  09/02/19 0320     Updated:  09/02/19 0322    Narrative:       INDICATION:   Abdominal pain and weight loss foul-smelling urine confusion 10 pound weight loss in 3 months. Vaginal knot. Poor appetite.    TECHNIQUE:   CT of the abdomen and pelvis during the intravenous ministration of nonionic contrast media. contrast. Contrast dose recorded inpatient chart. Coronal and sagittal reconstructions were obtained.  Radiation dose reduction techniques included automated  exposure control or exposure modulation based on body size. Radiation audit for number of CT and nuclear  cardiology exams performed in the last year: 0.      COMPARISON:   None available.    FINDINGS:  Lung bases: There is some breathing motion. There is some dependent atelectasis. Calcified granuloma at the medial right lung base.    There is a moderate size hiatal hernia.    Abdomen: Liver, gallbladder, spleen, adrenal glands, pancreas are normal. There are multiple bilateral renal cysts and too small to characterize probable renal cysts. There is no hydronephrosis. There are atherosclerotic vascular calcifications involving  the abdominal aorta and vessel origins.    There is no evidence for bowel obstruction. Appendix is radiographically unremarkable.    Pelvis: There is widening of the bladder neck and likely pelvic floor relaxation. There is soft tissue fullness posterior to the symphysis pubis and at the anterior aspect of the bladder which may correspond to the mass on physical examination per  patient. It measures about 2.5 x 2.9 x 3.2 cm in dimension. Correlate with physical exam findings. Pathology arising from the anterior inferior bladder wall or possibly the labia in the differential.    No free fluid in the abdomen or pelvis. Small fat-containing periumbilical hernia.      Impression:         1. The neck of the bladder is widened and there is pelvic floor relaxation. Anterior to the neck of the bladder posterior to the symphysis pubis associated with the anterior inferior bladder wall is a soft tissue fullness which may correspond to the mass  described by the patient on physical exam. It measures up to about 3.2 cm in dimension. Please correlate with physical exam findings. This could be a mass arising from the bladder or possibly the labia and its currently nonspecific.  2. Atherosclerotic vascular calcifications.  3. Moderate-sized hilar hernia.        Signer Name: Trini Li MD   Signed: 9/2/2019 3:20 AM   Workstation Name: [email or web address]    [facility]    MRI Brain Without  Contrast [[id number]] Collected:  09/02/19 0219     Updated:  09/02/19 0221    Narrative:       INDICATION:    Syncope confusion altered mental status increased weakness since starting Abilify.    TECHNIQUE:   MRI of the brain without contrast.    COMPARISON:    None available.    FINDINGS:  There is no abnormally restricted diffusion. There is no Chiari I malformation. Degenerative changes in the visualized upper cervical spine. There is likely an arachnoid cyst at the anteromedial aspect overlying the left frontal lobe. Its measures about  1.3 x 4.7 x 2.1 cm dimension. There is only mild associated mass effect upon the underlying frontal lobe. There is generalized atrophy otherwise. The major arterial intracranial flow voids are maintained. The mastoid air cells are clear. Only mild  mucosal thickening in the visualized paranasal sinuses. Prominent perivascular spaces and/or small chronic locations in the bilateral basal ganglia. Mild periventricular white matter signal abnormality is nonspecific but likely due to small vessel  disease.      Impression:       No acute intracranial abnormality. No recent infarct.  2. Atrophy and mild probable sequelae of small vessel disease.  3. There is what is likely a incidental arachnoid cyst overlying the medial anterior left frontal lobe.    Signer Name: Trini Li MD   Signed: 9/2/2019 2:19 AM   Workstation Name: [email or web address]    [facility]    XR Chest 1 View [[id number]] Collected:  09/02/19 0122     Updated:  09/02/19 0124    Narrative:       CR Chest 1 Vw    INDICATION:   Syncope and weakness altered mental status     COMPARISON:    None available.    FINDINGS:  Single portable AP view(s) of the chest.  Cardiac silhouette size is normal. There is generalized prominence of the interstitial markings. I suspect underlying chronic lung disease. I do not have comparison films. Component of acute interstitial edema or  atypical infection not excluded. No  pleural effusion or pneumothorax. No focal alveolar infiltrate.      Impression:       Generalized prominence of the interstitial markings could be acute or chronic in the absence of prior films.    Signer Name: Trini Li MD   Signed: 9/2/2019 1:22 AM   Workstation Name: [email or web address]    [facility]                    Results for orders placed during the hospital encounter of 09/02/19   Adult Transthoracic Echo Complete W/ Cont if Necessary Per Protocol    Narrative · Left ventricular wall thickness is consistent with mild concentric   hypertrophy.  · Mild aortic valve regurgitation is present.  · Mild tricuspid valve regurgitation is present.  · Estimated EF = 68%.  · Left ventricular systolic function is normal.  · Left ventricular diastolic dysfunction (grade I) consistent with   impaired relaxation.  · Normal right ventricular cavity size, wall thickness, systolic function   and septal motion noted.  · Saline test results are negative.  · The aortic valve exhibits moderate sclerosis.  · Moderate MAC is present.  · No evidence of pulmonary hypertension is present.  · There is no evidence of pericardial effusion.            Discharge Details        Discharge Medications      New Medications      Instructions Start Date   chlorproMAZINE 50 MG tablet  Commonly known as:  THORAZINE   50 mg, Oral, Nightly      metoprolol tartrate 50 MG tablet  Commonly known as:  LOPRESSOR   50 mg, Oral, Every 12 Hours Scheduled         Continue These Medications      Instructions Start Date   pravastatin 20 MG tablet  Commonly known as:  PRAVACHOL   20 mg, Oral, Nightly         Stop These Medications    ARIPiprazole 10 MG tablet  Commonly known as:  ABILIFY            No Known Allergies      Discharge Disposition:  Home or Self Carehome    Discharge Diet:  Diet Order   Procedures   • Diet Regular; Cardiac     Discharge Activity:  routine    CODE STATUS:    Code Status and Medical Interventions:   Ordered at:  19 0422     Level Of Support Discussed With:    Patient     Code Status:    CPR     Medical Interventions (Level of Support Prior to Arrest):    Full         No future appointments.    Additional Instructions for the Follow-ups that You Need to Schedule     Ambulatory Referral to Home Health   As directed      Face to Face Visit Date:  9/3/2019    Follow-up provider for Plan of Care?:  I treated the patient in an acute care facility and will not continue treatment after discharge.    Follow-up provider:  BRICE YU [[id number]]    Reason/Clinical Findings:  Generalized weakness, hypertensive crisis    Describe mobility limitations that make leaving home difficult:  Generalized weakness    Nursing/Therapeutic Services Requested:  Skilled Nursing Physical Therapy    Skilled nursing orders:  Medication education Mental health    PT orders:  Other (Eval and Treat)               Time Spent on Discharge:  40 minutes    Electronically signed by Barb Lorenzo MD, 19, 8:49 AM.        Electronically signed by Barb Lorenzo MD at 2019  8:59 AM       [address] [address]  Phone:  [phone]  Fax:   Date: Sep 3, 2019      Ambulatory Referral to Home Health     Patient:  Betsy Youssef MRN:  [id number]   [address] [address] :  1945  SSN:    Phone: [phone] Sex:  F      INSURANCE PAYOR PLAN GROUP # SUBSCRIBER ID   Primary:  Secondary:    MEDICARE  [place] [id number]  [id number]      [id number]  [id number]      Referring Provider Information:  BARB LORENZO Phone: [phone] Fax:       Referral Information:   # Visits:  1 Referral Type: Home Health [42]   Urgency:  Routine Referral Reason: Specialty Services Required   Start Date: Sep 3, 2019 End Date:  To be determined by Insurer   Diagnosis: Confusion (R41.0 [ICD-10-CM] 298.9 [ICD-9-CM])  Dehydration (E86.0 [ICD-10-CM] 276.51 [ICD-9-CM])  Impaired mobility and  ADLs (Z74.09 [ICD-10-CM] 799.89 [ICD-9-CM])  Impaired functional mobility, balance, gait, and endurance (Z74.09 [ICD-10-CM] V49.89 [ICD-9-CM])  Hypertensive urgency (I16.0 [ICD-10-CM] 401.9 [ICD-9-CM])  Generalized weakness (R53.1 [ICD-10-CM] 780.79 [ICD-9-CM])      Refer to Dept:   Refer to Provider:   Refer to Facility:       Face to Face Visit Date: 9/3/2019  Follow-up provider for Plan of Care? I treated the patient in an acute care facility and will not continue treatment after discharge.  Follow-up provider: BRICE YU [[id number]]  Reason/Clinical Findings: Generalized weakness, hypertensive crisis  Describe mobility limitations that make leaving home difficult: Generalized weakness  Nursing/Therapeutic Services Requested: Skilled Nursing  Nursing/Therapeutic Services Requested: Physical Therapy  Skilled nursing orders: Medication education  Skilled nursing orders: Mental health  PT orders: Other (Eval and Treat)     This document serves as a request of services and does not constitute Insurance authorization or approval of services.  To determine eligibility, please contact the members Insurance carrier to verify and review coverage.     If you have medical questions regarding this request for services. Please contact [address] at [phone] between the hours of 8:00am - 5:00pm (Mon-Fri).       Verbal Order Mode: Telephone with readback   Authorizing Provider: Barb Garcia MD  Authorizing Provider's NPI: [id number]     Order Entered By: Mila Back RN 9/3/2019  4:07 PM     Electronically signed by: Barb Garcia MD 9/4/2019  6:57 AM

## 2021-12-08 NOTE — PROGRESS NOTES
Continued Stay Note  [facility]     Patient Name: Betsy Youssef  MRN: [id number]  Today's Date: 9/3/2019    Admit Date: 9/2/2019    Discharge Plan     Row Name 09/03/19 1602       Plan    Plan  Home with HH    Patient/Family in Agreement with Plan  yes    Plan Comments  Spoke to pt at  and she states she has neighbors and a brother and sister close who can help her. She is asking if HH could possibly see her upon d/c for a short while with no preference of agencies. SW to see pt regarding psyche resources. Pt thinks she will d/c tomorrow. CM will notify HH upon dc.     Final Discharge Disposition Code  06 - home with home health care        Discharge Codes    No documentation.       Expected Discharge Date and Time     Expected Discharge Date Expected Discharge Time    Sep 4, 2019             Mila Back RN     What is lung cancer screening? Lung cancer screening is a way in which doctors check the lungs for early signs of cancer in people who have no symptoms of lung cancer. A low-dose CT scan uses much less radiation than a normal CT scan and shows a more detailed image of the lungs than a standard X-ray. The goal of lung cancer screening is to find cancer early, before it has a chance to grow, spread, or cause problems. One large study found that smokers who were screened with low-dose CT scans were less likely to die of lung cancer than those who were screened with standard X-ray. Below is a summary of the things you need to know regarding screening for lung cancer with low-dose computed tomography (LDCT). This is a screening program that involves routine annual screening with LDCT studies of the lung. The LDCTs are done using low-dose radiation that is not thought to increase your cancer risk. If you have other serious medical conditions (other cancers, congestive heart failure) that limit your life expectancy to less than 10 years, you should not undergo lung cancer screening with LDCT. The chance is 20%-60% that the LDCT result will show abnormalities. This would require additional testing which could include repeat imaging or even invasive procedures. Most (about 95%) of \"abnormal\" LDCT results are false in the sense that no lung cancer is ultimately found. Additionally, some (about 10%) of the cancers found would not affect your life expectancy, even if undetected and untreated. If you are still smoking, the single most important thing that you can do to reduce your risk of dying of lung cancer is to quit. For this screening to be covered by Medicare and most other insurers, strict criteria must be met. If you do not meet these criteria, but still wish to undergo LDCT testing, you will be required to sign a waiver indicating your willingness to pay for the scan.

## 2023-04-24 ENCOUNTER — APPOINTMENT (OUTPATIENT)
Dept: CT IMAGING | Facility: HOSPITAL | Age: 78
End: 2023-04-24
Payer: MEDICARE

## 2023-04-24 ENCOUNTER — HOSPITAL ENCOUNTER (EMERGENCY)
Facility: HOSPITAL | Age: 78
Discharge: ANOTHER HOSPITAL | End: 2023-04-25
Attending: EMERGENCY MEDICINE | Admitting: EMERGENCY MEDICINE
Payer: MEDICARE

## 2023-04-24 DIAGNOSIS — H54.60 MONOCULAR VISION LOSS: Primary | ICD-10-CM

## 2023-04-24 LAB
ALBUMIN SERPL-MCNC: 4.1 G/DL (ref 3.5–5.2)
ALBUMIN/GLOB SERPL: 1.4 G/DL
ALP SERPL-CCNC: 41 U/L (ref 39–117)
ALT SERPL W P-5'-P-CCNC: 13 U/L (ref 1–33)
ANION GAP SERPL CALCULATED.3IONS-SCNC: 12.9 MMOL/L (ref 5–15)
APTT PPP: 29 SECONDS (ref 70–100)
AST SERPL-CCNC: 21 U/L (ref 1–32)
BASOPHILS # BLD AUTO: 0.05 10*3/MM3 (ref 0–0.2)
BASOPHILS NFR BLD AUTO: 0.5 % (ref 0–1.5)
BILIRUB SERPL-MCNC: 0.3 MG/DL (ref 0–1.2)
BUN SERPL-MCNC: 12 MG/DL (ref 8–23)
BUN/CREAT SERPL: 13 (ref 7–25)
CALCIUM SPEC-SCNC: 9.3 MG/DL (ref 8.6–10.5)
CHLORIDE SERPL-SCNC: 102 MMOL/L (ref 98–107)
CO2 SERPL-SCNC: 24.1 MMOL/L (ref 22–29)
CREAT SERPL-MCNC: 0.92 MG/DL (ref 0.57–1)
CRP SERPL-MCNC: <0.3 MG/DL (ref 0–0.5)
DEPRECATED RDW RBC AUTO: 43 FL (ref 37–54)
EGFRCR SERPLBLD CKD-EPI 2021: 64.3 ML/MIN/1.73
EOSINOPHIL # BLD AUTO: 0.25 10*3/MM3 (ref 0–0.4)
EOSINOPHIL NFR BLD AUTO: 2.7 % (ref 0.3–6.2)
ERYTHROCYTE [DISTWIDTH] IN BLOOD BY AUTOMATED COUNT: 12.5 % (ref 12.3–15.4)
ERYTHROCYTE [SEDIMENTATION RATE] IN BLOOD: 12 MM/HR (ref 0–30)
GLOBULIN UR ELPH-MCNC: 2.9 GM/DL
GLUCOSE SERPL-MCNC: 122 MG/DL (ref 65–99)
HCT VFR BLD AUTO: 40.1 % (ref 34–46.6)
HGB BLD-MCNC: 13 G/DL (ref 12–15.9)
IMM GRANULOCYTES # BLD AUTO: 0.03 10*3/MM3 (ref 0–0.05)
IMM GRANULOCYTES NFR BLD AUTO: 0.3 % (ref 0–0.5)
INR PPP: 1.04 (ref 0.9–1.1)
LYMPHOCYTES # BLD AUTO: 1.8 10*3/MM3 (ref 0.7–3.1)
LYMPHOCYTES NFR BLD AUTO: 19.2 % (ref 19.6–45.3)
MCH RBC QN AUTO: 30 PG (ref 26.6–33)
MCHC RBC AUTO-ENTMCNC: 32.4 G/DL (ref 31.5–35.7)
MCV RBC AUTO: 92.6 FL (ref 79–97)
MONOCYTES # BLD AUTO: 0.77 10*3/MM3 (ref 0.1–0.9)
MONOCYTES NFR BLD AUTO: 8.2 % (ref 5–12)
NEUTROPHILS NFR BLD AUTO: 6.47 10*3/MM3 (ref 1.7–7)
NEUTROPHILS NFR BLD AUTO: 69.1 % (ref 42.7–76)
NRBC BLD AUTO-RTO: 0 /100 WBC (ref 0–0.2)
PLATELET # BLD AUTO: 183 10*3/MM3 (ref 140–450)
PMV BLD AUTO: 10.5 FL (ref 6–12)
POTASSIUM SERPL-SCNC: 4.1 MMOL/L (ref 3.5–5.2)
PROT SERPL-MCNC: 7 G/DL (ref 6–8.5)
PROTHROMBIN TIME: 13.9 SECONDS (ref 12.5–14.5)
RBC # BLD AUTO: 4.33 10*6/MM3 (ref 3.77–5.28)
SODIUM SERPL-SCNC: 139 MMOL/L (ref 136–145)
WBC NRBC COR # BLD: 9.37 10*3/MM3 (ref 3.4–10.8)

## 2023-04-24 PROCEDURE — 85730 THROMBOPLASTIN TIME PARTIAL: CPT | Performed by: EMERGENCY MEDICINE

## 2023-04-24 PROCEDURE — 70498 CT ANGIOGRAPHY NECK: CPT

## 2023-04-24 PROCEDURE — 85025 COMPLETE CBC W/AUTO DIFF WBC: CPT | Performed by: EMERGENCY MEDICINE

## 2023-04-24 PROCEDURE — 70496 CT ANGIOGRAPHY HEAD: CPT

## 2023-04-24 PROCEDURE — 70450 CT HEAD/BRAIN W/O DYE: CPT

## 2023-04-24 PROCEDURE — 99284 EMERGENCY DEPT VISIT MOD MDM: CPT

## 2023-04-24 PROCEDURE — 85610 PROTHROMBIN TIME: CPT | Performed by: EMERGENCY MEDICINE

## 2023-04-24 PROCEDURE — 85652 RBC SED RATE AUTOMATED: CPT | Performed by: EMERGENCY MEDICINE

## 2023-04-24 PROCEDURE — 25510000001 IOPAMIDOL 61 % SOLUTION: Performed by: EMERGENCY MEDICINE

## 2023-04-24 PROCEDURE — 80053 COMPREHEN METABOLIC PANEL: CPT | Performed by: EMERGENCY MEDICINE

## 2023-04-24 PROCEDURE — 86140 C-REACTIVE PROTEIN: CPT | Performed by: EMERGENCY MEDICINE

## 2023-04-24 PROCEDURE — 93005 ELECTROCARDIOGRAM TRACING: CPT

## 2023-04-24 PROCEDURE — 93005 ELECTROCARDIOGRAM TRACING: CPT | Performed by: EMERGENCY MEDICINE

## 2023-04-24 RX ORDER — METOPROLOL SUCCINATE 25 MG/1
1 TABLET, EXTENDED RELEASE ORAL DAILY
COMMUNITY
Start: 2023-04-11

## 2023-04-24 RX ADMIN — IOPAMIDOL 100 ML: 612 INJECTION, SOLUTION INTRAVENOUS at 21:21

## 2023-04-25 VITALS
HEART RATE: 67 BPM | DIASTOLIC BLOOD PRESSURE: 85 MMHG | BODY MASS INDEX: 27.6 KG/M2 | HEIGHT: 62 IN | OXYGEN SATURATION: 95 % | TEMPERATURE: 97.7 F | SYSTOLIC BLOOD PRESSURE: 194 MMHG | RESPIRATION RATE: 16 BRPM | WEIGHT: 150 LBS

## 2023-04-25 NOTE — ED PROVIDER NOTES
HPI: Betsy Youssef is a 77 y.o. female who presents to the emergency department complaining of vision loss.  She states that about 36 hours ago she woke up from sleep with near complete loss of vision in her right eye.  She has no other complaints to include eye pain, headache, numbness, weakness or other complaints.  No nausea, vomiting.  She states only thing she can see is a glimmer of light in the periphery of her vision.  She has never had symptoms like this before.  She notes that she went to a  the day prior and thought it was may be nerves, today when the symptoms did not resolve she came to the ER for evaluation.      REVIEW OF SYSTEMS: All other systems reviewed and are negative     PAST MEDICAL HISTORY:   Past Medical History:   Diagnosis Date   • Anxiety    • Depression    • HLD (hyperlipidemia)    • Hypertension    • Urinary tract infection         FAMILY HISTORY:   Family History   Problem Relation Age of Onset   • Cancer Mother    • Heart disease Father         SOCIAL HISTORY:   Social History     Socioeconomic History   • Marital status:    Tobacco Use   • Smoking status: Never   • Smokeless tobacco: Never   Vaping Use   • Vaping Use: Never used   Substance and Sexual Activity   • Alcohol use: No   • Drug use: No   • Sexual activity: Never        SURGICAL HISTORY:   Past Surgical History:   Procedure Laterality Date   • HYSTERECTOMY          ALLERGIES: Patient has no known allergies.       PHYSICAL EXAM:   VITAL SIGNS:   Vitals:    23 2130   BP: (!) 197/84   Pulse: 67   Resp:    Temp:    SpO2: 96%      CONSTITUTIONAL: Awake, well appearing, nontoxic   HENT: Atraumatic, normocephalic, oral mucosa moist, airway patent. Nares patent without drainage. External ears normal.   EYES: Conjunctivae clear, EOMI, PERRL   NECK: Trachea midline, nontender, supple   CARDIOVASCULAR: Normal heart rate, Normal rhythm.  PULMONARY/CHEST: Normal work of breathing. Clear to auscultation, no  rhonchi, wheezes, or rales.  ABDOMINAL: Nondistended, soft, nontender, no rebound or guarding.  NEUROLOGIC: Patient cannot sense light or movement out of the right eye, left eye with normal vision, extraocular movements are intact, pupils seem to be equal and reactive, strength and sensation and coordination grossly normal throughout all 4 extremities  EXTREMITIES: No clubbing, cyanosis, or edema   SKIN: Warm, Dry, No erythema, No rash       ED COURSE / MEDICAL DECISION MAKING:     Betsy Youssef is a 77 y.o. female who presents to the emergency department for evaluation of acute sudden painless vision loss.  Well-developed well-nourished elderly lady in no distress with exam as above.  Her vital signs are notable for hypertension.  Her exam is as above.  Other than vision loss her exam is relatively nonfocal.  Given this is painless the top of my differential would be some sort of embolic event or CVA.  Will obtain imaging, basic labs and EKG.  Disposition pending.    Differential diagnosis includes central retinal artery versus venous occlusion, CVA, malignancy, retinal detachment among other etiologies.    ED Course as of 04/25/23 0027 Mon Apr 24, 2023 1934 EKG interpreted by me: Sinus rhythm, normal rate, no acute ST changes, some nonspecific T waves, this is an atypical EKG [MP]      ED Course User Index  [MP] Mahendra Santiago MD       Lab work and imaging is all unremarkable.  Patient has had no change in her symptoms.  Given her presentation I do feel she needs to be evaluated by ophthalmology and/or neurology.  Discussed the case with the  transfer center, patient has been accepted to the ER for further evaluation.  Patient and family updated and agreeable with plan of care.    Final diagnoses:   Monocular vision loss        Mahendra Santiago MD  04/25/23 0027